# Patient Record
Sex: MALE | Race: WHITE | NOT HISPANIC OR LATINO | Employment: OTHER | URBAN - METROPOLITAN AREA
[De-identification: names, ages, dates, MRNs, and addresses within clinical notes are randomized per-mention and may not be internally consistent; named-entity substitution may affect disease eponyms.]

---

## 2017-02-13 ENCOUNTER — HOSPITAL ENCOUNTER (OUTPATIENT)
Dept: RADIOLOGY | Facility: HOSPITAL | Age: 67
Discharge: HOME | End: 2017-02-13
Attending: INTERNAL MEDICINE
Payer: MEDICARE

## 2017-02-13 DIAGNOSIS — J42 CHRONIC BRONCHITIS, UNSPECIFIED CHRONIC BRONCHITIS TYPE: ICD-10-CM

## 2017-02-13 DIAGNOSIS — R07.81 PLEURITIC CHEST PAIN: Primary | ICD-10-CM

## 2017-02-13 DIAGNOSIS — R07.81 PLEURITIC CHEST PAIN: ICD-10-CM

## 2017-02-13 PROCEDURE — 71020 XR CHEST PA AND LATERAL: CPT | Mod: TC,PO

## 2017-02-13 PROCEDURE — 71020 XR CHEST PA AND LATERAL: CPT | Mod: 26,,, | Performed by: RADIOLOGY

## 2017-06-15 ENCOUNTER — HOSPITAL ENCOUNTER (OUTPATIENT)
Dept: RADIOLOGY | Facility: HOSPITAL | Age: 67
Discharge: HOME | End: 2017-06-15
Attending: INTERNAL MEDICINE
Payer: MEDICARE

## 2017-06-15 DIAGNOSIS — Z01.818 PRE-OPERATIVE CLEARANCE: ICD-10-CM

## 2017-06-15 DIAGNOSIS — Z01.818 PRE-OPERATIVE CLEARANCE: Primary | ICD-10-CM

## 2017-06-15 PROCEDURE — 71020 XR CHEST PA AND LATERAL: CPT | Mod: 26,,, | Performed by: RADIOLOGY

## 2017-06-15 PROCEDURE — 71020 XR CHEST PA AND LATERAL: CPT | Mod: TC,PO

## 2018-06-06 ENCOUNTER — HOSPITAL ENCOUNTER (INPATIENT)
Facility: HOSPITAL | Age: 68
LOS: 5 days | Discharge: HOME | DRG: 872 | End: 2018-06-11
Attending: EMERGENCY MEDICINE | Admitting: INTERNAL MEDICINE
Payer: MEDICARE

## 2018-06-06 DIAGNOSIS — R65.20 SEVERE SEPSIS: Primary | ICD-10-CM

## 2018-06-06 DIAGNOSIS — J18.9 PNEUMONIA OF RIGHT MIDDLE LOBE DUE TO INFECTIOUS ORGANISM: ICD-10-CM

## 2018-06-06 DIAGNOSIS — R79.89 ELEVATED LIVER FUNCTION TESTS: ICD-10-CM

## 2018-06-06 DIAGNOSIS — N17.9 AKI (ACUTE KIDNEY INJURY): ICD-10-CM

## 2018-06-06 DIAGNOSIS — E87.20 LACTIC ACIDOSIS: ICD-10-CM

## 2018-06-06 DIAGNOSIS — K52.9 COLITIS: ICD-10-CM

## 2018-06-06 DIAGNOSIS — A41.9 SEVERE SEPSIS: Primary | ICD-10-CM

## 2018-06-06 LAB
ALBUMIN SERPL BCP-MCNC: 3.4 G/DL
ALP SERPL-CCNC: 89 U/L
ALT SERPL W/O P-5'-P-CCNC: 52 U/L
ANION GAP SERPL CALC-SCNC: 13 MMOL/L
ANISOCYTOSIS BLD QL SMEAR: SLIGHT
AST SERPL-CCNC: 41 U/L
BASOPHILS # BLD AUTO: ABNORMAL K/UL
BASOPHILS NFR BLD: 0 %
BILIRUB SERPL-MCNC: 1.5 MG/DL
BILIRUB UR QL STRIP: ABNORMAL
BUN SERPL-MCNC: 33 MG/DL
CALCIUM SERPL-MCNC: 8.3 MG/DL
CHLORIDE SERPL-SCNC: 98 MMOL/L
CLARITY UR REFRACT.AUTO: CLEAR
CO2 SERPL-SCNC: 24 MMOL/L
COLOR UR AUTO: ABNORMAL
CREAT SERPL-MCNC: 1.7 MG/DL
DIFFERENTIAL METHOD: ABNORMAL
EOSINOPHIL # BLD AUTO: ABNORMAL K/UL
EOSINOPHIL NFR BLD: 0 %
ERYTHROCYTE [DISTWIDTH] IN BLOOD BY AUTOMATED COUNT: 16.4 %
EST. GFR  (AFRICAN AMERICAN): 46.9 ML/MIN/1.73 M^2
EST. GFR  (NON AFRICAN AMERICAN): 40.5 ML/MIN/1.73 M^2
GLUCOSE SERPL-MCNC: 122 MG/DL
GLUCOSE UR QL STRIP: NEGATIVE
HCT VFR BLD AUTO: 49.7 %
HGB BLD-MCNC: 16.4 G/DL
HGB UR QL STRIP: NEGATIVE
KETONES UR QL STRIP: NEGATIVE
LACTATE SERPL-SCNC: 3.4 MMOL/L
LEUKOCYTE ESTERASE UR QL STRIP: NEGATIVE
LYMPHOCYTES # BLD AUTO: ABNORMAL K/UL
LYMPHOCYTES NFR BLD: 5 %
MCH RBC QN AUTO: 28.5 PG
MCHC RBC AUTO-ENTMCNC: 33 G/DL
MCV RBC AUTO: 86 FL
MONOCYTES # BLD AUTO: ABNORMAL K/UL
MONOCYTES NFR BLD: 3 %
NEUTROPHILS NFR BLD: 86 %
NEUTS BAND NFR BLD MANUAL: 6 %
NITRITE UR QL STRIP: NEGATIVE
PH UR STRIP: 6 [PH] (ref 5–8)
PLATELET # BLD AUTO: 305 K/UL
PMV BLD AUTO: 11.8 FL
POTASSIUM SERPL-SCNC: 5 MMOL/L
PROT SERPL-MCNC: 7.3 G/DL
PROT UR QL STRIP: ABNORMAL
RBC # BLD AUTO: 5.76 M/UL
SODIUM SERPL-SCNC: 135 MMOL/L
SP GR UR STRIP: >=1.03 (ref 1–1.03)
URN SPEC COLLECT METH UR: ABNORMAL
UROBILINOGEN UR STRIP-ACNC: NEGATIVE EU/DL
WBC # BLD AUTO: 24.8 K/UL

## 2018-06-06 PROCEDURE — 85007 BL SMEAR W/DIFF WBC COUNT: CPT

## 2018-06-06 PROCEDURE — 87040 BLOOD CULTURE FOR BACTERIA: CPT | Mod: 59

## 2018-06-06 PROCEDURE — 93010 ELECTROCARDIOGRAM REPORT: CPT | Mod: ,,, | Performed by: INTERNAL MEDICINE

## 2018-06-06 PROCEDURE — 80053 COMPREHEN METABOLIC PANEL: CPT

## 2018-06-06 PROCEDURE — 83605 ASSAY OF LACTIC ACID: CPT

## 2018-06-06 PROCEDURE — 21400001 HC TELEMETRY ROOM

## 2018-06-06 PROCEDURE — 93005 ELECTROCARDIOGRAM TRACING: CPT

## 2018-06-06 PROCEDURE — 81003 URINALYSIS AUTO W/O SCOPE: CPT

## 2018-06-06 PROCEDURE — 99900035 HC TECH TIME PER 15 MIN (STAT)

## 2018-06-06 PROCEDURE — 63600175 PHARM REV CODE 636 W HCPCS: Performed by: EMERGENCY MEDICINE

## 2018-06-06 PROCEDURE — 85027 COMPLETE CBC AUTOMATED: CPT

## 2018-06-06 PROCEDURE — 25000003 PHARM REV CODE 250: Performed by: EMERGENCY MEDICINE

## 2018-06-06 RX ORDER — PREDNISONE 5 MG/1
1 TABLET ORAL DAILY
COMMUNITY
Start: 2018-05-30

## 2018-06-06 RX ORDER — BACLOFEN 10 MG/1
1 TABLET ORAL 3 TIMES DAILY
COMMUNITY
Start: 2018-02-06

## 2018-06-06 RX ORDER — QUETIAPINE FUMARATE 25 MG/1
25 TABLET, FILM COATED ORAL NIGHTLY
COMMUNITY

## 2018-06-06 RX ORDER — CYPROHEPTADINE HYDROCHLORIDE 4 MG/1
2 TABLET ORAL 3 TIMES DAILY
COMMUNITY
Start: 2018-05-30

## 2018-06-06 RX ORDER — DEXTROSE MONOHYDRATE AND SODIUM CHLORIDE 5; .45 G/100ML; G/100ML
INJECTION, SOLUTION INTRAVENOUS CONTINUOUS
Status: DISCONTINUED | OUTPATIENT
Start: 2018-06-07 | End: 2018-06-09

## 2018-06-06 RX ORDER — CEFEPIME HYDROCHLORIDE 2 G/1
2 INJECTION, POWDER, FOR SOLUTION INTRAVENOUS
Status: COMPLETED | OUTPATIENT
Start: 2018-06-06 | End: 2018-06-06

## 2018-06-06 RX ORDER — TRAMADOL HYDROCHLORIDE 50 MG/1
50 TABLET ORAL EVERY 8 HOURS PRN
COMMUNITY
Start: 2018-05-04

## 2018-06-06 RX ORDER — ROPINIROLE 1 MG/1
1 TABLET, FILM COATED ORAL NIGHTLY
COMMUNITY
Start: 2018-04-06

## 2018-06-06 RX ORDER — TRAZODONE HYDROCHLORIDE 100 MG/1
1 TABLET ORAL NIGHTLY
COMMUNITY
Start: 2015-11-10

## 2018-06-06 RX ORDER — SILDENAFIL 100 MG/1
100 TABLET, FILM COATED ORAL
COMMUNITY
Start: 2018-03-27 | End: 2019-03-27

## 2018-06-06 RX ORDER — ASPIRIN 81 MG/1
81 TABLET ORAL DAILY
COMMUNITY

## 2018-06-06 RX ORDER — ALENDRONATE SODIUM 35 MG/1
1 TABLET ORAL
COMMUNITY
Start: 2017-05-31

## 2018-06-06 RX ORDER — ONDANSETRON 2 MG/ML
4 INJECTION INTRAMUSCULAR; INTRAVENOUS
Status: COMPLETED | OUTPATIENT
Start: 2018-06-06 | End: 2018-06-06

## 2018-06-06 RX ORDER — ALBUTEROL SULFATE 2.5 MG/.5ML
2.5 SOLUTION RESPIRATORY (INHALATION) EVERY 4 HOURS PRN
COMMUNITY

## 2018-06-06 RX ORDER — ROSUVASTATIN CALCIUM 20 MG/1
1 TABLET, COATED ORAL DAILY
COMMUNITY
Start: 2016-12-27

## 2018-06-06 RX ORDER — OMEPRAZOLE 20 MG/1
1 CAPSULE, DELAYED RELEASE ORAL DAILY
COMMUNITY
Start: 2018-01-26

## 2018-06-06 RX ORDER — PREGABALIN 150 MG/1
300 CAPSULE ORAL 2 TIMES DAILY
COMMUNITY
Start: 2017-11-09 | End: 2018-11-09

## 2018-06-06 RX ORDER — BUPROPION HYDROCHLORIDE 150 MG/1
150 TABLET, EXTENDED RELEASE ORAL 2 TIMES DAILY
COMMUNITY
Start: 2018-05-04

## 2018-06-06 RX ORDER — VENLAFAXINE 37.5 MG/1
TABLET ORAL 2 TIMES DAILY
COMMUNITY
Start: 2018-03-20

## 2018-06-06 RX ORDER — MELOXICAM 7.5 MG/1
1 TABLET ORAL 2 TIMES DAILY
COMMUNITY
Start: 2018-04-02

## 2018-06-06 RX ORDER — MULTIVITAMIN
1 TABLET ORAL DAILY
COMMUNITY

## 2018-06-06 RX ORDER — CLOPIDOGREL BISULFATE 75 MG/1
1 TABLET ORAL DAILY
COMMUNITY
Start: 2018-02-06

## 2018-06-06 RX ADMIN — CEFEPIME 2 G: 2 INJECTION, POWDER, FOR SOLUTION INTRAVENOUS at 09:06

## 2018-06-06 RX ADMIN — SODIUM CHLORIDE 2721 ML: 0.9 INJECTION, SOLUTION INTRAVENOUS at 08:06

## 2018-06-06 RX ADMIN — ONDANSETRON 4 MG: 2 INJECTION INTRAMUSCULAR; INTRAVENOUS at 08:06

## 2018-06-06 RX ADMIN — AZITHROMYCIN MONOHYDRATE 500 MG: 500 INJECTION, POWDER, LYOPHILIZED, FOR SOLUTION INTRAVENOUS at 09:06

## 2018-06-06 RX ADMIN — DEXTROSE AND SODIUM CHLORIDE: 5; .45 INJECTION, SOLUTION INTRAVENOUS at 11:06

## 2018-06-07 PROBLEM — K52.9 ENTERITIS: Status: ACTIVE | Noted: 2018-06-07

## 2018-06-07 PROBLEM — J44.9 COPD (CHRONIC OBSTRUCTIVE PULMONARY DISEASE): Status: ACTIVE | Noted: 2018-06-07

## 2018-06-07 PROBLEM — N17.9 ACUTE RENAL FAILURE: Status: ACTIVE | Noted: 2018-06-07

## 2018-06-07 PROBLEM — I25.10 CAD (CORONARY ARTERY DISEASE): Status: ACTIVE | Noted: 2018-06-07

## 2018-06-07 LAB
ALBUMIN SERPL BCP-MCNC: 2.8 G/DL
ALP SERPL-CCNC: 79 U/L
ALT SERPL W/O P-5'-P-CCNC: 35 U/L
ANION GAP SERPL CALC-SCNC: 6 MMOL/L
AST SERPL-CCNC: 31 U/L
BASOPHILS # BLD AUTO: 0.04 K/UL
BASOPHILS NFR BLD: 0.3 %
BILIRUB SERPL-MCNC: 0.9 MG/DL
BUN SERPL-MCNC: 27 MG/DL
CALCIUM SERPL-MCNC: 7.5 MG/DL
CHLORIDE SERPL-SCNC: 105 MMOL/L
CO2 SERPL-SCNC: 24 MMOL/L
CREAT SERPL-MCNC: 1.3 MG/DL
DIFFERENTIAL METHOD: ABNORMAL
EOSINOPHIL # BLD AUTO: 0.1 K/UL
EOSINOPHIL NFR BLD: 0.9 %
ERYTHROCYTE [DISTWIDTH] IN BLOOD BY AUTOMATED COUNT: 16 %
EST. GFR  (AFRICAN AMERICAN): >60 ML/MIN/1.73 M^2
EST. GFR  (NON AFRICAN AMERICAN): 56 ML/MIN/1.73 M^2
GLUCOSE SERPL-MCNC: 93 MG/DL
HCT VFR BLD AUTO: 40.1 %
HGB BLD-MCNC: 13.1 G/DL
LACTATE SERPL-SCNC: 1.5 MMOL/L
LACTATE SERPL-SCNC: 2.2 MMOL/L
LYMPHOCYTES # BLD AUTO: 0.9 K/UL
LYMPHOCYTES NFR BLD: 6.2 %
MCH RBC QN AUTO: 28.8 PG
MCHC RBC AUTO-ENTMCNC: 32.7 G/DL
MCV RBC AUTO: 88 FL
MONOCYTES # BLD AUTO: 0.8 K/UL
MONOCYTES NFR BLD: 5.4 %
NEUTROPHILS # BLD AUTO: 12.2 K/UL
NEUTROPHILS NFR BLD: 87.2 %
PLATELET # BLD AUTO: 175 K/UL
PLATELET BLD QL SMEAR: ABNORMAL
PMV BLD AUTO: 11 FL
POTASSIUM SERPL-SCNC: 4.1 MMOL/L
PROT SERPL-MCNC: 5.9 G/DL
RBC # BLD AUTO: 4.55 M/UL
SODIUM SERPL-SCNC: 135 MMOL/L
WBC # BLD AUTO: 14.08 K/UL

## 2018-06-07 PROCEDURE — 25000003 PHARM REV CODE 250: Performed by: NURSE PRACTITIONER

## 2018-06-07 PROCEDURE — 96375 TX/PRO/DX INJ NEW DRUG ADDON: CPT

## 2018-06-07 PROCEDURE — 83605 ASSAY OF LACTIC ACID: CPT

## 2018-06-07 PROCEDURE — 96365 THER/PROPH/DIAG IV INF INIT: CPT | Mod: 59

## 2018-06-07 PROCEDURE — 96376 TX/PRO/DX INJ SAME DRUG ADON: CPT

## 2018-06-07 PROCEDURE — 87046 STOOL CULTR AEROBIC BACT EA: CPT | Mod: 59

## 2018-06-07 PROCEDURE — S5010 5% DEXTROSE AND 0.45% SALINE: HCPCS | Performed by: EMERGENCY MEDICINE

## 2018-06-07 PROCEDURE — 21400001 HC TELEMETRY ROOM

## 2018-06-07 PROCEDURE — 25000003 PHARM REV CODE 250: Performed by: EMERGENCY MEDICINE

## 2018-06-07 PROCEDURE — 99285 EMERGENCY DEPT VISIT HI MDM: CPT | Mod: 25

## 2018-06-07 PROCEDURE — 96366 THER/PROPH/DIAG IV INF ADDON: CPT

## 2018-06-07 PROCEDURE — 85025 COMPLETE CBC W/AUTO DIFF WBC: CPT

## 2018-06-07 PROCEDURE — S0030 INJECTION, METRONIDAZOLE: HCPCS | Performed by: NURSE PRACTITIONER

## 2018-06-07 PROCEDURE — 63600175 PHARM REV CODE 636 W HCPCS: Performed by: NURSE PRACTITIONER

## 2018-06-07 PROCEDURE — 87427 SHIGA-LIKE TOXIN AG IA: CPT

## 2018-06-07 PROCEDURE — 87045 FECES CULTURE AEROBIC BACT: CPT

## 2018-06-07 PROCEDURE — 63600175 PHARM REV CODE 636 W HCPCS: Performed by: HOSPITALIST

## 2018-06-07 PROCEDURE — 89055 LEUKOCYTE ASSESSMENT FECAL: CPT

## 2018-06-07 PROCEDURE — 63600175 PHARM REV CODE 636 W HCPCS: Performed by: EMERGENCY MEDICINE

## 2018-06-07 PROCEDURE — 96367 TX/PROPH/DG ADDL SEQ IV INF: CPT

## 2018-06-07 PROCEDURE — 80053 COMPREHEN METABOLIC PANEL: CPT

## 2018-06-07 PROCEDURE — 96361 HYDRATE IV INFUSION ADD-ON: CPT

## 2018-06-07 PROCEDURE — 99233 SBSQ HOSP IP/OBS HIGH 50: CPT | Mod: ,,, | Performed by: SURGERY

## 2018-06-07 RX ORDER — METRONIDAZOLE 500 MG/100ML
500 INJECTION, SOLUTION INTRAVENOUS
Status: DISCONTINUED | OUTPATIENT
Start: 2018-06-07 | End: 2018-06-10 | Stop reason: ALTCHOICE

## 2018-06-07 RX ORDER — CIPROFLOXACIN 2 MG/ML
400 INJECTION, SOLUTION INTRAVENOUS
Status: DISCONTINUED | OUTPATIENT
Start: 2018-06-07 | End: 2018-06-08

## 2018-06-07 RX ORDER — CEFEPIME HYDROCHLORIDE 2 G/50ML
2 INJECTION, SOLUTION INTRAVENOUS
Status: DISCONTINUED | OUTPATIENT
Start: 2018-06-08 | End: 2018-06-10

## 2018-06-07 RX ORDER — CEFEPIME HYDROCHLORIDE 2 G/1
2 INJECTION, POWDER, FOR SOLUTION INTRAVENOUS
Status: DISCONTINUED | OUTPATIENT
Start: 2018-06-07 | End: 2018-06-07

## 2018-06-07 RX ORDER — ONDANSETRON 2 MG/ML
4 INJECTION INTRAMUSCULAR; INTRAVENOUS EVERY 6 HOURS PRN
Status: DISCONTINUED | OUTPATIENT
Start: 2018-06-07 | End: 2018-06-11 | Stop reason: HOSPADM

## 2018-06-07 RX ORDER — RAMELTEON 8 MG/1
8 TABLET ORAL NIGHTLY PRN
Status: DISCONTINUED | OUTPATIENT
Start: 2018-06-07 | End: 2018-06-07

## 2018-06-07 RX ORDER — MORPHINE SULFATE 2 MG/ML
2 INJECTION, SOLUTION INTRAMUSCULAR; INTRAVENOUS EVERY 4 HOURS PRN
Status: DISCONTINUED | OUTPATIENT
Start: 2018-06-07 | End: 2018-06-09

## 2018-06-07 RX ADMIN — DEXTROSE AND SODIUM CHLORIDE: 5; .45 INJECTION, SOLUTION INTRAVENOUS at 08:06

## 2018-06-07 RX ADMIN — METRONIDAZOLE 500 MG: 500 INJECTION, SOLUTION INTRAVENOUS at 11:06

## 2018-06-07 RX ADMIN — AZITHROMYCIN MONOHYDRATE 500 MG: 500 INJECTION, POWDER, LYOPHILIZED, FOR SOLUTION INTRAVENOUS at 09:06

## 2018-06-07 RX ADMIN — CIPROFLOXACIN 400 MG: 2 INJECTION, SOLUTION INTRAVENOUS at 02:06

## 2018-06-07 RX ADMIN — DEXTROSE AND SODIUM CHLORIDE: 5; .45 INJECTION, SOLUTION INTRAVENOUS at 07:06

## 2018-06-07 RX ADMIN — MORPHINE SULFATE 2 MG: 2 INJECTION, SOLUTION INTRAMUSCULAR; INTRAVENOUS at 11:06

## 2018-06-07 RX ADMIN — CIPROFLOXACIN 400 MG: 2 INJECTION, SOLUTION INTRAVENOUS at 11:06

## 2018-06-07 RX ADMIN — CEFEPIME 2 G: 2 INJECTION, POWDER, FOR SOLUTION INTRAVENOUS at 05:06

## 2018-06-07 RX ADMIN — METRONIDAZOLE 500 MG: 500 INJECTION, SOLUTION INTRAVENOUS at 04:06

## 2018-06-07 NOTE — HOSPITAL COURSE
Presented to the emergency room with abdominal pain, nausea and vomiting, and diarrhea.  Treated with IV fluids and antibiotics.  CT performed.  Patient admitted with presumed enteritis.  Surgery was consulted    Less abdominal pain, but not resolved, mild nausea, formed stool passed, GB no seen on ultrasound.    June 9, 2018 at 12:14 p.m.:  Patient complains no abdominal pain.  Abdomen is soft and nondistended and nontender.  Patient is hungry and he wishes to eat.  The CT images confirms presence of gallstones.  The recommendation at this time is to resume oral feeding.  (Hussein Pearson)    06/11/2018.  Patient states he has got a crampy abdominal pain after a few bites of regular food.  He states today he has some generalized soreness around the abdomen mainly on the left side.  He does not report any nausea and vomiting.  The contracted gallbladder around the gallstones is probably old and chronic and I do not feel it is the cause of his current abdominal discomfort.    The CT scan did show some calcifications in  the intra-abdominal aorta

## 2018-06-07 NOTE — ED NOTES
Received pt lying on stretcher with SR up X 2. HOB elevated 90. Call light in reach. Pt admitted - awaiting bed availability.  Cont on monitor with NSR. Pt O2 87-90. O2 4L/NC applied - pt states will keep oxygen on for awhile and then will take it off because it bothers him. Pt with no c/o of abd pain at this time. Pt has IV fluids infusing to L hand and RFA with no s/s of infiltration noted. Pt voiding with no difficulty -clear michelle urine noted.   Pt up in chair to clean bed linens - stool noted on bed. Pt clothes soiled and removed with gown applied. Pt extremely SOB with exertion to chair and back to bed. Pt states has lungs scarred from previous rib fractures.   Repositioned to comfort

## 2018-06-07 NOTE — CONSULTS
[facility] -   General Surgery  Consult Note    Patient Name: Vinicius Sung  MRN: [id number]  Code Status: No Order  Admission Date: 6/6/2018  Hospital Length of Stay: 0 days  Attending Physician: Eric Monge MD  Primary Care Provider: Vj Gonzalez MD    Patient information was obtained from parent and ER records.     Inpatient consult to General Surgery  Consult performed by: YESSY COOPER  Consult ordered by: ALEN DAS  Reason for consult: Dilated bowel on CT scan abdominal distention  Assessment/Recommendations: Suspected enteritis nausea and vomiting, and associated diarrhea.  Volume depletion.  Elevated white count.  These contribute to sepsis        Subjective:     Principal Problem: <principal problem not specified>    History of Present Illness: Patient reports he developed nausea vomiting and diarrhea all started about Monday.  This was associated with a bad taste in his mouth.  He developed abdominal distention and some cramping pain. He presented to the emergency room where CT scan showed some dilated loops of small bowel as well as thickening of the colon.  He had an elevated white count and a mild increase in his lactic acid.    Patient was treated with IV fluids and antibiotics.    He states he is feeling better this morning    No current facility-administered medications on file prior to encounter.      No current outpatient prescriptions on file prior to encounter.       Review of patient's allergies indicates:  No Known Allergies    Past Medical History:   Diagnosis Date    CAD (coronary artery disease)     COPD (chronic obstructive pulmonary disease)     Coronary artery disease     GERD (gastroesophageal reflux disease)     Major depressive disorder     PAD (peripheral artery disease)     ST elevation (STEMI) myocardial infarction     Myocardial Infarction     Past Surgical History:   Procedure Laterality Date    BACK SURGERY      CARDIAC SURGERY       [place] AMPUTATION LOW LEG THRU TIB/FIB      Amputation, Below The Knee     Family History     None        Social History Main Topics    Smoking status: Former Smoker     Types: Cigarettes    Smokeless tobacco: Not on file      Comment: 3ppd smoker x 35yrs, recently quit in past week or so    Alcohol use No      Comment: quit 14 yrs ago    Drug use: No    Sexual activity: Not on file     Review of Systems   Constitutional: Positive for fatigue.   HENT: Negative.         Bad taste in his mouth   Eyes: Negative.    Respiratory: Negative.    Cardiovascular: Negative.    Gastrointestinal: Positive for abdominal distention, abdominal pain, diarrhea, nausea and vomiting.   Endocrine: Negative.    Genitourinary: Negative.    Musculoskeletal: Negative.    Skin: Negative.    Allergic/Immunologic: Negative.    Neurological: Negative.    Hematological: Negative.    Psychiatric/Behavioral: Negative.      Objective:     Vital Signs (Most Recent):  Temp: 98.6 °F (37 °C) (06/07/18 0645)  Pulse: 95 (06/07/18 0801)  Resp: 20 (06/07/18 0801)  BP: (!) 129/59 (06/07/18 0801)  SpO2: 98 % (06/07/18 0801) Vital Signs (24h Range):  Temp:  [98 °F (36.7 °C)-98.7 °F (37.1 °C)] 98.6 °F (37 °C)  Pulse:  [] 95  Resp:  [18-27] 20  SpO2:  [91 %-98 %] 98 %  BP: ()/(57-74) 129/59     Weight: 90.7 kg (200 lb)  Body mass index is 27.12 kg/m².    Physical Exam   Constitutional: He is oriented to person, place, and time.   Mildly chronically ill, somewhat unkept   HENT:   Head: Normocephalic and atraumatic.   adentulous   Neck: Normal range of motion. Neck supple. No tracheal deviation present. No thyromegaly present.   Cardiovascular: Normal rate and regular rhythm.    Pulmonary/Chest: Effort normal and breath sounds normal.   Abdominal: Soft. He exhibits distension. Tenderness:  minimal generalized. There is no rebound and no guarding. No hernia.   Musculoskeletal: Normal range of motion.   Lymphadenopathy:     He has no cervical  adenopathy.   Neurological: He is oriented to person, place, and time.   Skin: Skin is warm. Capillary refill takes less than 2 seconds.   Psychiatric: He has a normal mood and affect. His behavior is normal. Judgment and thought content normal.   Vitals reviewed.      Significant Labs:  CBC:   Recent Labs  Lab 06/07/18  0543   WBC 14.08*   RBC 4.55*   HGB 13.1*   HCT 40.1      MCV 88   MCH 28.8   MCHC 32.7     BMP:   Recent Labs  Lab 06/06/18  2106   *   *   K 5.0   CL 98   CO2 24   BUN 33*   CREATININE 1.7*   CALCIUM 8.3*     CMP:   Recent Labs  Lab 06/06/18  2106   *   CALCIUM 8.3*   ALBUMIN 3.4*   PROT 7.3   *   K 5.0   CO2 24   CL 98   BUN 33*   CREATININE 1.7*   ALKPHOS 89   ALT 52*   AST 41*   BILITOT 1.5*     LFTs:   Recent Labs  Lab 06/06/18  2106   ALT 52*   AST 41*   ALKPHOS 89   BILITOT 1.5*   PROT 7.3   ALBUMIN 3.4*     Lactic acid levels reviewed  Coagulation: No results for input(s): LABPROT, INR, APTT in the last 168 hours.  Cardiac markers: No results for input(s): CKMB, CPKMB, TROPONINT, TROPONINI, MYOGLOBIN in the last 168 hours.  ABGs: No results for input(s): PH, PCO2, PO2, HCO3, POCSATURATED, BE in the last 168 hours.    Significant Diagnostics:      EXAMINATION:  CT ABDOMEN PELVIS WITHOUT CONTRAST    CLINICAL HISTORY:  Abdominal Pain;    FINDINGS:  There is some scarring in both lung bases.  Atelectatic change also noted with some pleural thickening which may be chronic.  See the liver and spleen is normal.  There are stones in the gallbladder.  The pancreas appears normal.  The right adrenal gland is unremarkable.  There are surgical clips in the expected location of the left adrenal gland.  No kidney stones or hydronephrosis.  The aorta is atherosclerotic.  No adenopathy or free fluid.  Multiple dilated loops of small bowel noted involving the upper abdomen with collapsed distal small bowel.  As she has all the is there is nonspecific thickening of the wall  of the sigmoid and rectum.  This is there is also thickening of the wall of the urinary bladder.  Compression deformities involving the L3 and L5 bodies.  Postoperative changes of the thoracolumbar spine.  Baclofen pump in the right gluteal region.  Postop changes left pelvis.   Impression       Multiple dilated loops of small bowel in the upper abdomen with collapsed distal small bowel suggesting partial or developing small bowel obstruction.  Cholelithiasis.  Atelectatic change in both lung bases with pleural thickening along with some pulmonary parenchymal scarring.  Thickening of the wall of the rectum and sigmoid raising the question of colitis.  Thickening of the wall of the urinary bladder.    All CT scans at this facility use dose modulation, iterative reconstruction, and/or weight based dosing when appropriate to reduce radiation dose to as low as reasonably achievable.      Electronically signed by: Genaro Isabel MD  Date: 06/06/2018  Time: 22:17         Assessment/Plan:     Severe sepsis    Likely sent and there E to enteritis with volume depletion  Agree with antibiotics and IV fluids          VTE Risk Mitigation     None          Thank you for your consult. I will follow-up with patient. Please contact us if you have any additional questions.    Ovidio Mariee MD  General Surgery  [facility] -

## 2018-06-07 NOTE — ED NOTES
LOC: The patient is awake, alert and aware of environment with an appropriate affect, the patient is oriented x 3 and speaking appropriately.  C/O nausea, vomiting, diarrhea & generalized abdominal pain x  past 3 days.  Pt states he is unable to eat solids or tolerate water.  Pt denies  blood in stool or emesis.   Also c/o headache.    APPEARANCE: Patient is clean and well groomed, patient's clothing is properly fastened.  HEENT: mucus membranes dry  SKIN: Brief WNL.   MUSCULOSKELETAL: Brief WNL except generalized  Skeletal pain as usual secondary to x broken bones from fall yrs ago  RESPIRATORY: Loose cough x multiple days (more than usual), SOB with exertion. Rhonchi throughout ( pt provides hx of 3ppd smoker x about 35 yrs) recently on Chantix to quit smoking  CARDIAC: Sinus Tach at 118/min.  Denies chest pain  GASTRO: generalized tenderness throughout abdomen, >tenderness to right upper & lower quads.  Abdomen distended & tight  : Brief WNL, denies difficulty voiding & states he hasnt noticed that his  urine is darker than usual  Peripheral Vasc: Brief WNL, no peripheral edema  NEURO: Brief WNL  PSYCH: Brief WNLPatient verbally verified and Spelled Full Name and Date of Birth. Fall precautions in place at this time: Patient side rails are up x 2, bed is low and locked, call light is in reach of pt, and fall risk band applied to right wrist.  Pt educated on fall risk and verbalized understanding.

## 2018-06-07 NOTE — ED NOTES
Pt up to BSC - voided and had very soft formed brown stool.  Specimen collected and sent to lab.  Back to bed and repositioned to comfort.

## 2018-06-07 NOTE — HPI
Vinicius Sung is a 67 year old male with history of COPD, CAD, and PAD who presented from [facility] Emergency Department for abdominal pain that began 3 days ago. Associated symptoms include diarrhea, decreased appetite, vomiting, and metallic taste. He denies fever, chills, and chest pain. CT Chest showed evidence of colitis involving rectum and sigmoid and possible partial small bowel obstruction. He was subsequently transferred for further work up. Patient reports abdominal pain has significantly improved. No further episodes of diarrhea and vomiting. He remains NPO.

## 2018-06-07 NOTE — SUBJECTIVE & OBJECTIVE
Past Medical History:   Diagnosis Date    CAD (coronary artery disease)     COPD (chronic obstructive pulmonary disease)     Coronary artery disease     GERD (gastroesophageal reflux disease)     Major depressive disorder     PAD (peripheral artery disease)     ST elevation (STEMI) myocardial infarction     Myocardial Infarction       Past Surgical History:   Procedure Laterality Date    BACK SURGERY      CARDIAC SURGERY      [place] AMPUTATION LOW LEG THRU TIB/FIB      Amputation, Below The Knee       Review of patient's allergies indicates:  No Known Allergies    No current facility-administered medications on file prior to encounter.      No current outpatient prescriptions on file prior to encounter.     Family History     None        Social History Main Topics    Smoking status: Former Smoker     Types: Cigarettes    Smokeless tobacco: Not on file      Comment: 3ppd smoker x 35yrs, recently quit in past week or so    Alcohol use No      Comment: quit 14 yrs ago    Drug use: No    Sexual activity: Not on file     Review of Systems   Constitutional: Positive for fatigue. Negative for activity change, appetite change and chills.   HENT: Negative.  Negative for congestion, drooling, hearing loss and postnasal drip.         Bad taste in his mouth   Eyes: Negative.    Respiratory: Negative for cough, chest tightness and shortness of breath.    Cardiovascular: Negative.  Negative for chest pain and leg swelling.   Gastrointestinal: Positive for abdominal distention, abdominal pain, diarrhea, nausea and vomiting. Negative for anal bleeding and blood in stool.   Endocrine: Negative.  Negative for cold intolerance, polydipsia and polyuria.   Genitourinary: Negative.  Negative for discharge, dysuria, frequency and penile pain.   Musculoskeletal: Negative.  Negative for back pain, gait problem and joint swelling.   Skin: Negative.  Negative for color change and rash.   Allergic/Immunologic: Negative.     Neurological: Negative.    Hematological: Negative.    Psychiatric/Behavioral: Negative.       Objective:     Vital Signs (Most Recent):  Temp: 98 °F (36.7 °C) (06/07/18 1305)  Pulse: 96 (06/07/18 1305)  Resp: 18 (06/07/18 1305)  BP: 118/72 (06/07/18 1305)  SpO2: 95 % (06/07/18 1305) Vital Signs (24h Range):  Temp:  [98 °F (36.7 °C)-98.7 °F (37.1 °C)] 98 °F (36.7 °C)  Pulse:  [] 96  Resp:  [18-27] 18  SpO2:  [91 %-98 %] 95 %  BP: ()/(57-74) 118/72     Weight: 90.7 kg (200 lb)  Body mass index is 27.12 kg/m².    Physical Exam     Significant Labs:   CBC:   Recent Labs  Lab 06/06/18  2028 06/07/18  0543   WBC 24.80* 14.08*   HGB 16.4 13.1*   HCT 49.7 40.1    175     CMP:   Recent Labs  Lab 06/06/18  2106 06/07/18  0832   * 135*   K 5.0 4.1   CL 98 105   CO2 24 24   * 93   BUN 33* 27*   CREATININE 1.7* 1.3   CALCIUM 8.3* 7.5*   PROT 7.3 5.9*   ALBUMIN 3.4* 2.8*   BILITOT 1.5* 0.9   ALKPHOS 89 79   AST 41* 31   ALT 52* 35   ANIONGAP 13 6*   EGFRNONAA 40.5* 56*       Significant Imaging:  Imaging Results          US Abdomen Limited (Final result)  Result time 06/07/18 09:43:31    Final result by Carlton Maradiaga MD (06/07/18 09:43:31)                 Impression:      Fatty infiltration liver.  Limited study.    Gallbladder is not seen and on CT is contracted and calcified.      Electronically signed by: Carlton Maradiaga MD  Date:    06/07/2018  Time:    09:43             Narrative:    EXAMINATION:  US ABDOMEN LIMITED    CLINICAL HISTORY:  abdominal pain, elevated LFTs, sepsis;    COMPARISON:  06/06/2018    FINDINGS:  Limited right upper quadrant ultrasound performed.  The liver measures 14.7 cm in length and demonstrates increased echogenicity.  Common bile duct is within normal limits at 3.5 mm..  Gallbladder is not seen and on CT is contracted and calcified.  Pancreas is obscured by bowel gas.  The right kidney is normal in length measuring 11.4 cm. No right sided hydronephrosis or renal  masses identified.                               CT Abdomen Pelvis  Without Contrast (Final result)  Result time 06/06/18 22:17:25   Procedure changed from CT Abdomen Pelvis With Contrast     Final result by Genaro Isabel MD (06/06/18 22:17:25)                 Impression:      Multiple dilated loops of small bowel in the upper abdomen with collapsed distal small bowel suggesting partial or developing small bowel obstruction.  Cholelithiasis.  Atelectatic change in both lung bases with pleural thickening along with some pulmonary parenchymal scarring.  Thickening of the wall of the rectum and sigmoid raising the question of colitis.  Thickening of the wall of the urinary bladder.    All CT scans at this facility use dose modulation, iterative reconstruction, and/or weight based dosing when appropriate to reduce radiation dose to as low as reasonably achievable.      Electronically signed by: Genaro Isabel MD  Date:    06/06/2018  Time:    22:17             Narrative:    EXAMINATION:  CT ABDOMEN PELVIS WITHOUT CONTRAST    CLINICAL HISTORY:  Abdominal Pain;    FINDINGS:  There is some scarring in both lung bases.  Atelectatic change also noted with some pleural thickening which may be chronic.  See the liver and spleen is normal.  There are stones in the gallbladder.  The pancreas appears normal.  The right adrenal gland is unremarkable.  There are surgical clips in the expected location of the left adrenal gland.  No kidney stones or hydronephrosis.  The aorta is atherosclerotic.  No adenopathy or free fluid.  Multiple dilated loops of small bowel noted involving the upper abdomen with collapsed distal small bowel.  As she has all the is there is nonspecific thickening of the wall of the sigmoid and rectum.  This is there is also thickening of the wall of the urinary bladder.  Compression deformities involving the L3 and L5 bodies.  Postoperative changes of the thoracolumbar spine.  Baclofen pump in the right gluteal  region.  Postop changes left pelvis.                               X-Ray Chest AP Portable (Final result)  Result time 06/06/18 20:53:11    Final result by Genaro Isabel MD (06/06/18 20:53:11)                 Impression:      Infiltrate lateral right midlung.    All CT scans at this facility use dose modulation, iterative reconstruction, and/or weight based dosing when appropriate to reduce radiation dose to as low as reasonably achievable.      Electronically signed by: Genaro Isabel MD  Date:    06/06/2018  Time:    20:53             Narrative:    EXAMINATION:  XR CHEST AP PORTABLE    CLINICAL HISTORY:  Cough;    COMPARISON:  06/15/2017.    FINDINGS:  The heart is normal in size.  Sternotomy wires.  There is scarring in the lung bases.  Infiltrate in the lateral right mid lung.

## 2018-06-07 NOTE — ED NOTES
Pt up in chair and then placed in hospital bed. Pt did get SOB with exertion. O2 applied and encouraged pt to leave on until SOB passes.  Positioned to comfort - pt states feels much better in hospital bed. Pt requesting something to drink - cont NPO for now.

## 2018-06-07 NOTE — SUBJECTIVE & OBJECTIVE
No current facility-administered medications on file prior to encounter.      No current outpatient prescriptions on file prior to encounter.       Review of patient's allergies indicates:  No Known Allergies    Past Medical History:   Diagnosis Date    CAD (coronary artery disease)     COPD (chronic obstructive pulmonary disease)     Coronary artery disease     GERD (gastroesophageal reflux disease)     Major depressive disorder     PAD (peripheral artery disease)     ST elevation (STEMI) myocardial infarction     Myocardial Infarction     Past Surgical History:   Procedure Laterality Date    BACK SURGERY      CARDIAC SURGERY      [place] AMPUTATION LOW LEG THRU TIB/FIB      Amputation, Below The Knee     Family History     None        Social History Main Topics    Smoking status: Former Smoker     Types: Cigarettes    Smokeless tobacco: Not on file      Comment: 3ppd smoker x 35yrs, recently quit in past week or so    Alcohol use No      Comment: quit 14 yrs ago    Drug use: No    Sexual activity: Not on file     Review of Systems   Constitutional: Positive for fatigue.   HENT: Negative.         Bad taste in his mouth   Eyes: Negative.    Respiratory: Negative.    Cardiovascular: Negative.    Gastrointestinal: Positive for abdominal distention, abdominal pain, diarrhea, nausea and vomiting.   Endocrine: Negative.    Genitourinary: Negative.    Musculoskeletal: Negative.    Skin: Negative.    Allergic/Immunologic: Negative.    Neurological: Negative.    Hematological: Negative.    Psychiatric/Behavioral: Negative.      Objective:     Vital Signs (Most Recent):  Temp: 98.6 °F (37 °C) (06/07/18 0645)  Pulse: 95 (06/07/18 0801)  Resp: 20 (06/07/18 0801)  BP: (!) 129/59 (06/07/18 0801)  SpO2: 98 % (06/07/18 0801) Vital Signs (24h Range):  Temp:  [98 °F (36.7 °C)-98.7 °F (37.1 °C)] 98.6 °F (37 °C)  Pulse:  [] 95  Resp:  [18-27] 20  SpO2:  [91 %-98 %] 98 %  BP: ()/(57-74) 129/59     Weight: 90.7 kg  (200 lb)  Body mass index is 27.12 kg/m².    Physical Exam   Constitutional: He is oriented to person, place, and time.   Mildly chronically ill, somewhat unkept   HENT:   Head: Normocephalic and atraumatic.   adentulous   Neck: Normal range of motion. Neck supple. No tracheal deviation present. No thyromegaly present.   Cardiovascular: Normal rate and regular rhythm.    Pulmonary/Chest: Effort normal and breath sounds normal.   Abdominal: Soft. He exhibits distension. Tenderness:  minimal generalized. There is no rebound and no guarding. No hernia.   Musculoskeletal: Normal range of motion.   Lymphadenopathy:     He has no cervical adenopathy.   Neurological: He is oriented to person, place, and time.   Skin: Skin is warm. Capillary refill takes less than 2 seconds.   Psychiatric: He has a normal mood and affect. His behavior is normal. Judgment and thought content normal.   Vitals reviewed.      Significant Labs:  CBC:   Recent Labs  Lab 06/07/18  0543   WBC 14.08*   RBC 4.55*   HGB 13.1*   HCT 40.1      MCV 88   MCH 28.8   MCHC 32.7     BMP:   Recent Labs  Lab 06/06/18  2106   *   *   K 5.0   CL 98   CO2 24   BUN 33*   CREATININE 1.7*   CALCIUM 8.3*     CMP:   Recent Labs  Lab 06/06/18  2106   *   CALCIUM 8.3*   ALBUMIN 3.4*   PROT 7.3   *   K 5.0   CO2 24   CL 98   BUN 33*   CREATININE 1.7*   ALKPHOS 89   ALT 52*   AST 41*   BILITOT 1.5*     LFTs:   Recent Labs  Lab 06/06/18  2106   ALT 52*   AST 41*   ALKPHOS 89   BILITOT 1.5*   PROT 7.3   ALBUMIN 3.4*     Lactic acid levels reviewed  Coagulation: No results for input(s): LABPROT, INR, APTT in the last 168 hours.  Cardiac markers: No results for input(s): CKMB, CPKMB, TROPONINT, TROPONINI, MYOGLOBIN in the last 168 hours.  ABGs: No results for input(s): PH, PCO2, PO2, HCO3, POCSATURATED, BE in the last 168 hours.    Significant Diagnostics:      EXAMINATION:  CT ABDOMEN PELVIS WITHOUT CONTRAST    CLINICAL HISTORY:  Abdominal  Pain;    FINDINGS:  There is some scarring in both lung bases.  Atelectatic change also noted with some pleural thickening which may be chronic.  See the liver and spleen is normal.  There are stones in the gallbladder.  The pancreas appears normal.  The right adrenal gland is unremarkable.  There are surgical clips in the expected location of the left adrenal gland.  No kidney stones or hydronephrosis.  The aorta is atherosclerotic.  No adenopathy or free fluid.  Multiple dilated loops of small bowel noted involving the upper abdomen with collapsed distal small bowel.  As she has all the is there is nonspecific thickening of the wall of the sigmoid and rectum.  This is there is also thickening of the wall of the urinary bladder.  Compression deformities involving the L3 and L5 bodies.  Postoperative changes of the thoracolumbar spine.  Baclofen pump in the right gluteal region.  Postop changes left pelvis.   Impression       Multiple dilated loops of small bowel in the upper abdomen with collapsed distal small bowel suggesting partial or developing small bowel obstruction.  Cholelithiasis.  Atelectatic change in both lung bases with pleural thickening along with some pulmonary parenchymal scarring.  Thickening of the wall of the rectum and sigmoid raising the question of colitis.  Thickening of the wall of the urinary bladder.    All CT scans at this facility use dose modulation, iterative reconstruction, and/or weight based dosing when appropriate to reduce radiation dose to as low as reasonably achievable.      Electronically signed by: Genaro Isabel MD  Date: 06/06/2018  Time: 22:17

## 2018-06-07 NOTE — ED NOTES
Pt resting with eyes closed, awakens easily to verbal stimuli, resp e/u, normal sinus rhythm on monitor, skin warm and dry, nad. Pt aware of poc. Will continue to monitor.

## 2018-06-07 NOTE — H&P
[facility] Hospital Medicine  History & Physical    Patient Name: Vinicius Sung  MRN: [id number]  Admission Date: 6/6/2018  Attending Physician: Eric Monge MD   Primary Care Provider: Vj Gonzalez MD      Patient information was obtained from patient and ER records.     Subjective:     Principal Problem:Severe sepsis    Chief Complaint:   Chief Complaint   Patient presents with    Vomiting     symptoms x past 3 days, Pepto Bismol taken with little relief    Diarrhea    Abdominal Pain        HPI: Vinicius Sung is a 67 year old male with history of COPD, CAD, and PAD who presented from [facility] Emergency Department for abdominal pain that began 3 days ago. Associated symptoms include diarrhea, decreased appetite, vomiting, and metallic taste. He denies fever, chills, and chest pain. CT Chest showed evidence of colitis involving rectum and sigmoid and possible partial small bowel obstruction. He was subsequently transferred for further work up. Patient reports abdominal pain has significantly improved. No further episodes of diarrhea and vomiting. He remains NPO.       Past Medical History:   Diagnosis Date    CAD (coronary artery disease)     COPD (chronic obstructive pulmonary disease)     Coronary artery disease     GERD (gastroesophageal reflux disease)     Major depressive disorder     PAD (peripheral artery disease)     ST elevation (STEMI) myocardial infarction     Myocardial Infarction       Past Surgical History:   Procedure Laterality Date    BACK SURGERY      CARDIAC SURGERY      [place] AMPUTATION LOW LEG THRU TIB/FIB      Amputation, Below The Knee       Review of patient's allergies indicates:  No Known Allergies    No current facility-administered medications on file prior to encounter.      No current outpatient prescriptions on file prior to encounter.     Family History     None        Social History Main Topics    Smoking status: Former Smoker     Types:  Cigarettes    Smokeless tobacco: Not on file      Comment: 3ppd smoker x 35yrs, recently quit in past week or so    Alcohol use No      Comment: quit 14 yrs ago    Drug use: No    Sexual activity: Not on file     Review of Systems   Constitutional: Positive for fatigue. Negative for activity change, appetite change and chills.   HENT: Negative.  Negative for congestion, drooling, hearing loss and postnasal drip.         Bad taste in his mouth   Eyes: Negative.    Respiratory: Negative for cough, chest tightness and shortness of breath.    Cardiovascular: Negative.  Negative for chest pain and leg swelling.   Gastrointestinal: Positive for abdominal distention, abdominal pain, diarrhea, nausea and vomiting. Negative for anal bleeding and blood in stool.   Endocrine: Negative.  Negative for cold intolerance, polydipsia and polyuria.   Genitourinary: Negative.  Negative for discharge, dysuria, frequency and penile pain.   Musculoskeletal: Negative.  Negative for back pain, gait problem and joint swelling.   Skin: Negative.  Negative for color change and rash.   Allergic/Immunologic: Negative.    Neurological: Negative.    Hematological: Negative.    Psychiatric/Behavioral: Negative.       Objective:     Vital Signs (Most Recent):  Temp: 98 °F (36.7 °C) (06/07/18 1305)  Pulse: 96 (06/07/18 1305)  Resp: 18 (06/07/18 1305)  BP: 118/72 (06/07/18 1305)  SpO2: 95 % (06/07/18 1305) Vital Signs (24h Range):  Temp:  [98 °F (36.7 °C)-98.7 °F (37.1 °C)] 98 °F (36.7 °C)  Pulse:  [] 96  Resp:  [18-27] 18  SpO2:  [91 %-98 %] 95 %  BP: ()/(57-74) 118/72     Weight: 90.7 kg (200 lb)  Body mass index is 27.12 kg/m².    Physical Exam   Constitutional: He is oriented to person, place, and time.   HENT:   Head: Normocephalic and atraumatic.   Neck: Normal range of motion. Neck supple. No tracheal deviation present. No thyromegaly present.   Cardiovascular: Normal rate and regular rhythm.    Pulmonary/Chest: Effort normal  and breath sounds normal.   Abdominal: Soft. He exhibits distension/tenderness.There is no rebound and no guarding. No hernia.   Musculoskeletal: Normal range of motion.   Lymphadenopathy:     He has no cervical adenopathy.   Neurological: He is oriented to person, place, and time.   Skin: Skin is warm. Capillary refill takes less than 2 seconds.   Psychiatric: He has a normal mood and affect. His behavior is normal. Judgment and thought content normal.   Vitals reviewed.    Significant Labs:   CBC:   Recent Labs  Lab 06/06/18 2028 06/07/18  0543   WBC 24.80* 14.08*   HGB 16.4 13.1*   HCT 49.7 40.1    175     CMP:   Recent Labs  Lab 06/06/18 2106 06/07/18  0832   * 135*   K 5.0 4.1   CL 98 105   CO2 24 24   * 93   BUN 33* 27*   CREATININE 1.7* 1.3   CALCIUM 8.3* 7.5*   PROT 7.3 5.9*   ALBUMIN 3.4* 2.8*   BILITOT 1.5* 0.9   ALKPHOS 89 79   AST 41* 31   ALT 52* 35   ANIONGAP 13 6*   EGFRNONAA 40.5* 56*       Significant Imaging:  Imaging Results          US Abdomen Limited (Final result)  Result time 06/07/18 09:43:31    Final result by Carlton Maradiaga MD (06/07/18 09:43:31)                 Impression:      Fatty infiltration liver.  Limited study.    Gallbladder is not seen and on CT is contracted and calcified.      Electronically signed by: Carlton Maradiaga MD  Date:    06/07/2018  Time:    09:43             Narrative:    EXAMINATION:  US ABDOMEN LIMITED    CLINICAL HISTORY:  abdominal pain, elevated LFTs, sepsis;    COMPARISON:  06/06/2018    FINDINGS:  Limited right upper quadrant ultrasound performed.  The liver measures 14.7 cm in length and demonstrates increased echogenicity.  Common bile duct is within normal limits at 3.5 mm..  Gallbladder is not seen and on CT is contracted and calcified.  Pancreas is obscured by bowel gas.  The right kidney is normal in length measuring 11.4 cm. No right sided hydronephrosis or renal masses identified.                               CT Abdomen Pelvis   Without Contrast (Final result)  Result time 06/06/18 22:17:25   Procedure changed from CT Abdomen Pelvis With Contrast     Final result by Genaro Isabel MD (06/06/18 22:17:25)                 Impression:      Multiple dilated loops of small bowel in the upper abdomen with collapsed distal small bowel suggesting partial or developing small bowel obstruction.  Cholelithiasis.  Atelectatic change in both lung bases with pleural thickening along with some pulmonary parenchymal scarring.  Thickening of the wall of the rectum and sigmoid raising the question of colitis.  Thickening of the wall of the urinary bladder.    All CT scans at this facility use dose modulation, iterative reconstruction, and/or weight based dosing when appropriate to reduce radiation dose to as low as reasonably achievable.      Electronically signed by: Genaro Isabel MD  Date:    06/06/2018  Time:    22:17             Narrative:    EXAMINATION:  CT ABDOMEN PELVIS WITHOUT CONTRAST    CLINICAL HISTORY:  Abdominal Pain;    FINDINGS:  There is some scarring in both lung bases.  Atelectatic change also noted with some pleural thickening which may be chronic.  See the liver and spleen is normal.  There are stones in the gallbladder.  The pancreas appears normal.  The right adrenal gland is unremarkable.  There are surgical clips in the expected location of the left adrenal gland.  No kidney stones or hydronephrosis.  The aorta is atherosclerotic.  No adenopathy or free fluid.  Multiple dilated loops of small bowel noted involving the upper abdomen with collapsed distal small bowel.  As she has all the is there is nonspecific thickening of the wall of the sigmoid and rectum.  This is there is also thickening of the wall of the urinary bladder.  Compression deformities involving the L3 and L5 bodies.  Postoperative changes of the thoracolumbar spine.  Baclofen pump in the right gluteal region.  Postop changes left pelvis.                                X-Ray Chest AP Portable (Final result)  Result time 06/06/18 20:53:11    Final result by Genaro Isabel MD (06/06/18 20:53:11)                 Impression:      Infiltrate lateral right midlung.    All CT scans at this facility use dose modulation, iterative reconstruction, and/or weight based dosing when appropriate to reduce radiation dose to as low as reasonably achievable.      Electronically signed by: Genaro Isabel MD  Date:    06/06/2018  Time:    20:53             Narrative:    EXAMINATION:  XR CHEST AP PORTABLE    CLINICAL HISTORY:  Cough;    COMPARISON:  06/15/2017.    FINDINGS:  The heart is normal in size.  Sternotomy wires.  There is scarring in the lung bases.  Infiltrate in the lateral right mid lung.                                  Assessment/Plan:     * Severe sepsis    --Leukocytosis, initial lactic acid>2, tachypnea, tachycardia  --CT findings concerning for colitis  --IV abx\      Colitis    --IV Flagyl and Ciprofloxacin  --Stool studies pending  --reactive ileus likely related colitis   --Bowel rest for now  --KUB today        Acute renal failure    -Improved with IVF's.        COPD (chronic obstructive pulmonary disease)    -Duonebs        CAD (coronary artery disease)    -hold ASA, Plavix, and STATIN          VTE Risk Mitigation     SCD's        Ian Leon NP  Department of Hospital Medicine   [facility]

## 2018-06-07 NOTE — HPI
Patient reports he developed nausea vomiting and diarrhea all started about Monday.  This was associated with a bad taste in his mouth.  He developed abdominal distention and some cramping pain. He presented to the emergency room where CT scan showed some dilated loops of small bowel as well as thickening of the colon.  He had an elevated white count and a mild increase in his lactic acid.    Patient was treated with IV fluids and antibiotics.    He states he is feeling better this morning

## 2018-06-07 NOTE — ED NOTES
Dr. Mcadams is at the bedside updating pt on test results and poc. Pt has verbalized understanding, and he states that he is fine with being transferred to [facility] for admission.

## 2018-06-07 NOTE — ED NOTES
Pt sleeping, md YULIYA is aware of pt's O2 sat (hx of COPD), sinus tach on cardiac monitor, skin warm and dry, nad. Will continue to monitor.

## 2018-06-07 NOTE — ASSESSMENT & PLAN NOTE
--IV Flagyl and Ciprofloxacin  --Stool studies pending  --reactive ileus likely related colitis   --Bowel rest for now  --KUB today

## 2018-06-07 NOTE — ASSESSMENT & PLAN NOTE
--Leukocytosis, initial lactic acid>2, tachypnea, tachycardia  --CT findings concerning for colitis  --IV abx

## 2018-06-07 NOTE — ED NOTES
Pt watching tv, aaoX4, sinus tach on cardiac monitor, resp e/u, nad. Md is aware of pt's O2 sat. Pt has a hx of COPD. Pt is aware of poc. Bed locked in lowest position, side rails up X2, call bell in reach, blanket given and lights turned down for comfort. Will continue to monitor.

## 2018-06-07 NOTE — ED NOTES
[facility] has left [facility] ED to transport pt to [facility] at this time.

## 2018-06-07 NOTE — ED PROVIDER NOTES
Encounter Date: 6/6/2018       History     Chief Complaint   Patient presents with    Vomiting     symptoms x past 3 days, Pepto Bismol taken with little relief    Diarrhea    Abdominal Pain     Patient currently presents with complaint of abdominal pain.  Gradual onset of this event was first noted 2 - 3 days ago.  This is localized to the generalized.  This discomfort is described as sharp in nature.  There have been loose stools over the last few days.  There has been associated emesis.  There are not associated urinary complaints.  This is not a recurring problem.  Patient denies fever.            Review of patient's allergies indicates:  No Known Allergies  Past Medical History:   Diagnosis Date    CAD (coronary artery disease)     COPD (chronic obstructive pulmonary disease)     Coronary artery disease     GERD (gastroesophageal reflux disease)     Major depressive disorder     PAD (peripheral artery disease)     ST elevation (STEMI) myocardial infarction     Myocardial Infarction     Past Surgical History:   Procedure Laterality Date    BACK SURGERY      CARDIAC SURGERY      [place] AMPUTATION LOW LEG THRU TIB/FIB      Amputation, Below The Knee     History reviewed. No pertinent family history.  Social History   Substance Use Topics    Smoking status: Former Smoker     Types: Cigarettes    Smokeless tobacco: Not on file      Comment: 3ppd smoker x 35yrs, recently quit in past week or so    Alcohol use No      Comment: quit 14 yrs ago     Review of Systems   Constitutional: Negative for chills and fever.   HENT: Negative for congestion.    Respiratory: Negative for chest tightness and shortness of breath.    Cardiovascular: Negative for chest pain and leg swelling.   Gastrointestinal: Positive for abdominal distention, abdominal pain, diarrhea, nausea and vomiting. Negative for constipation.   Genitourinary: Negative for dysuria, frequency and urgency.   Skin: Negative for color change and rash.    Allergic/Immunologic: Negative for immunocompromised state.   Neurological: Negative for weakness and numbness.   Hematological: Negative for adenopathy. Does not bruise/bleed easily.   All other systems reviewed and are negative.      Physical Exam     Initial Vitals [06/06/18 2024]   BP Pulse Resp Temp SpO2   104/66 105 (!) 22 98 °F (36.7 °C) 95 %      MAP       78.67         Vitals:    06/06/18 2024 06/06/18 2041 06/06/18 2051 06/06/18 2053   BP: 104/66  100/65 103/61   Pulse: 105 (!) 112 110 (!) 112   Resp: (!) 22      Temp: 98 °F (36.7 °C)      TempSrc: Oral      SpO2: 95%      Weight: 90.7 kg (200 lb)      Height: 6' (1.829 m)       06/06/18 2055 06/06/18 2105 06/06/18 2117 06/06/18 2217   BP: (!) 97/58 106/65 (!) 131/57 (!) 110/58   Pulse: (!) 120 106 102 105   Resp:  (!) 27 (!) 22 (!) 24   Temp:       TempSrc:       SpO2:  (!) 92% (!) 94% (!) 94%   Weight:       Height:        06/06/18 2232 06/06/18 2240 06/06/18 2332   BP: (!) 146/63  113/64   Pulse: 105  104   Resp: (!) 21  (!) 23   Temp:  98.7 °F (37.1 °C)    TempSrc:  Oral    SpO2: (!) 93%  (!) 93%   Weight:      Height:            Physical Exam    Nursing note and vitals reviewed.  Constitutional: He appears well-developed and well-nourished. He is not diaphoretic. No distress.   HENT:   Head: Normocephalic and atraumatic.   Right Ear: External ear normal.   Left Ear: External ear normal.   Nose: Nose normal.   Mouth/Throat: Oropharynx is clear and moist.   Eyes: Conjunctivae and EOM are normal. Pupils are equal, round, and reactive to light. No scleral icterus.   Neck: Neck supple. No JVD present.   Cardiovascular: Normal rate, regular rhythm, normal heart sounds and intact distal pulses. Exam reveals no gallop and no friction rub.    No murmur heard.  Pulmonary/Chest: Breath sounds normal. No respiratory distress. He has no wheezes. He has no rhonchi. He has no rales.   Abdominal: Soft. Bowel sounds are normal. He exhibits distension. There is  tenderness. There is rebound and guarding.   Musculoskeletal: Normal range of motion. He exhibits no edema.   Neurological: He is alert and oriented to person, place, and time.   Skin: Skin is warm and dry. No rash noted.   Psychiatric: He has a normal mood and affect. His behavior is normal.         ED Course   Procedures  Labs Reviewed   LACTIC ACID, PLASMA - Abnormal; Notable for the following:        Result Value    Lactate (Lactic Acid) 3.4 (*)     All other components within normal limits   CBC W/ AUTO DIFFERENTIAL - Abnormal; Notable for the following:     WBC 24.80 (*)     RDW 16.4 (*)     Gran% 86.0 (*)     Lymph% 5.0 (*)     Mono% 3.0 (*)     All other components within normal limits   URINALYSIS - Abnormal; Notable for the following:     Color, UA Orange (*)     Specific Gravity, UA >=1.030 (*)     Protein, UA Trace (*)     Bilirubin (UA) 1+ (*)     All other components within normal limits   COMPREHENSIVE METABOLIC PANEL - Abnormal; Notable for the following:     Sodium 135 (*)     Glucose 122 (*)     BUN, Bld 33 (*)     Creatinine 1.7 (*)     Calcium 8.3 (*)     Albumin 3.4 (*)     Total Bilirubin 1.5 (*)     AST 41 (*)     ALT 52 (*)     eGFR if  46.9 (*)     eGFR if non  40.5 (*)     All other components within normal limits   CULTURE, BLOOD   CULTURE, BLOOD   CULTURE, STOOL   CLOSTRIDIUM DIFFICILE   LACTIC ACID, PLASMA   WBC, STOOL     EKG Readings: (Independently Interpreted)   Initial Reading: No STEMI. Rhythm: Sinus Tachycardia. Heart Rate: 112. Ectopy: No Ectopy. Conduction: Normal. Axis: Normal.       CT Abdomen Pelvis  Without Contrast   Final Result      Multiple dilated loops of small bowel in the upper abdomen with collapsed distal small bowel suggesting partial or developing small bowel obstruction.  Cholelithiasis.  Atelectatic change in both lung bases with pleural thickening along with some pulmonary parenchymal scarring.  Thickening of the wall of the  rectum and sigmoid raising the question of colitis.  Thickening of the wall of the urinary bladder.      All CT scans at this facility use dose modulation, iterative reconstruction, and/or weight based dosing when appropriate to reduce radiation dose to as low as reasonably achievable.         Electronically signed by: Genaro Isabel MD   Date:    06/06/2018   Time:    22:17      X-Ray Chest AP Portable   Final Result      Infiltrate lateral right midlung.      All CT scans at this facility use dose modulation, iterative reconstruction, and/or weight based dosing when appropriate to reduce radiation dose to as low as reasonably achievable.         Electronically signed by: Genaro Isabel MD   Date:    06/06/2018   Time:    20:53           Medical Decision Making:   ED Management:  All historical, clinical, radiographic, and laboratory findings were reviewed with the patient/family in detail along with the indications for transport to the facility in [place] in order to receive IV ABX, IVF, cardiac monitoring, and surgical consultation.  All remaining questions and concerns were addressed at this time and the patient/family communicates understanding and agrees to proceed accordingly.  Similarly, all pertinent details of the encounter were discussed with Dr. Medina via NP Idalia Weinstein who agrees to receive the patient at [facility] for further care as outlined above.  Additionally discussed the pertinent details of the encounter with Dr. Mariee of [facility] surgery.  I reviewed the findings from all lab results, imaging, and exam findings concerning for peritonitis.  He notes that he will evaluate the patient in consultation.  The patient will be transferred by [facility] ambulance services secondary to a need for ongoing cardiac monitoring and IVF en route.                            Clinical Impression:   The primary encounter diagnosis was Severe sepsis. Diagnoses of CHRIS (acute kidney injury), Lactic  acidosis, Pneumonia of right middle lobe due to infectious organism, Elevated liver function tests, and Colitis were also pertinent to this visit.                             Steve Mcadams MD  06/06/18 2271

## 2018-06-07 NOTE — ED NOTES
Pt presents from [facility]. Pt aaox3 and in nad. Pt states he has been having abdominal pain with n/v//d. Pt assisted with using urinal on arrival. Pt with urine output of 450ml clear yellow urine. Pt placed on cardiac monitor, cont. And auto bp cuff

## 2018-06-08 PROBLEM — N17.9 ACUTE RENAL FAILURE: Status: RESOLVED | Noted: 2018-06-07 | Resolved: 2018-06-08

## 2018-06-08 PROBLEM — K82.8 CALCIFICATION OF GALLBLADDER: Status: ACTIVE | Noted: 2018-06-08

## 2018-06-08 LAB
ALBUMIN SERPL BCP-MCNC: 2.9 G/DL
ALP SERPL-CCNC: 86 U/L
ALT SERPL W/O P-5'-P-CCNC: 31 U/L
ANION GAP SERPL CALC-SCNC: 8 MMOL/L
ANION GAP SERPL CALC-SCNC: 8 MMOL/L
AST SERPL-CCNC: 25 U/L
BASOPHILS # BLD AUTO: 0.03 K/UL
BASOPHILS NFR BLD: 0.2 %
BILIRUB SERPL-MCNC: 0.8 MG/DL
BUN SERPL-MCNC: 14 MG/DL
BUN SERPL-MCNC: 14 MG/DL
CALCIUM SERPL-MCNC: 7.8 MG/DL
CALCIUM SERPL-MCNC: 7.8 MG/DL
CHLORIDE SERPL-SCNC: 105 MMOL/L
CHLORIDE SERPL-SCNC: 105 MMOL/L
CO2 SERPL-SCNC: 20 MMOL/L
CO2 SERPL-SCNC: 20 MMOL/L
CREAT SERPL-MCNC: 1.1 MG/DL
CREAT SERPL-MCNC: 1.1 MG/DL
DIFFERENTIAL METHOD: ABNORMAL
EOSINOPHIL # BLD AUTO: 0.2 K/UL
EOSINOPHIL NFR BLD: 1.4 %
ERYTHROCYTE [DISTWIDTH] IN BLOOD BY AUTOMATED COUNT: 16 %
EST. GFR  (AFRICAN AMERICAN): >60 ML/MIN/1.73 M^2
EST. GFR  (AFRICAN AMERICAN): >60 ML/MIN/1.73 M^2
EST. GFR  (NON AFRICAN AMERICAN): >60 ML/MIN/1.73 M^2
EST. GFR  (NON AFRICAN AMERICAN): >60 ML/MIN/1.73 M^2
GLUCOSE SERPL-MCNC: 115 MG/DL
GLUCOSE SERPL-MCNC: 115 MG/DL
HCT VFR BLD AUTO: 39.6 %
HGB BLD-MCNC: 12.8 G/DL
LYMPHOCYTES # BLD AUTO: 0.6 K/UL
LYMPHOCYTES NFR BLD: 4.8 %
MAGNESIUM SERPL-MCNC: 2.3 MG/DL
MCH RBC QN AUTO: 28.5 PG
MCHC RBC AUTO-ENTMCNC: 32.3 G/DL
MCV RBC AUTO: 88 FL
MONOCYTES # BLD AUTO: 1.1 K/UL
MONOCYTES NFR BLD: 8.5 %
NEUTROPHILS # BLD AUTO: 11.1 K/UL
NEUTROPHILS NFR BLD: 85.1 %
PHOSPHATE SERPL-MCNC: 1.6 MG/DL
PLATELET # BLD AUTO: 163 K/UL
PMV BLD AUTO: 10.9 FL
POTASSIUM SERPL-SCNC: 4.1 MMOL/L
POTASSIUM SERPL-SCNC: 4.1 MMOL/L
PROT SERPL-MCNC: 6.3 G/DL
RBC # BLD AUTO: 4.49 M/UL
SODIUM SERPL-SCNC: 133 MMOL/L
SODIUM SERPL-SCNC: 133 MMOL/L
WBC # BLD AUTO: 13.08 K/UL
WBC #/AREA STL HPF: NORMAL /[HPF]

## 2018-06-08 PROCEDURE — 36415 COLL VENOUS BLD VENIPUNCTURE: CPT

## 2018-06-08 PROCEDURE — 83735 ASSAY OF MAGNESIUM: CPT

## 2018-06-08 PROCEDURE — 84100 ASSAY OF PHOSPHORUS: CPT

## 2018-06-08 PROCEDURE — 21400001 HC TELEMETRY ROOM

## 2018-06-08 PROCEDURE — 63600175 PHARM REV CODE 636 W HCPCS: Performed by: EMERGENCY MEDICINE

## 2018-06-08 PROCEDURE — 94761 N-INVAS EAR/PLS OXIMETRY MLT: CPT

## 2018-06-08 PROCEDURE — S5010 5% DEXTROSE AND 0.45% SALINE: HCPCS | Performed by: EMERGENCY MEDICINE

## 2018-06-08 PROCEDURE — 80053 COMPREHEN METABOLIC PANEL: CPT

## 2018-06-08 PROCEDURE — 63600175 PHARM REV CODE 636 W HCPCS: Performed by: NURSE PRACTITIONER

## 2018-06-08 PROCEDURE — 99223 1ST HOSP IP/OBS HIGH 75: CPT | Mod: ,,, | Performed by: SURGERY

## 2018-06-08 PROCEDURE — 27000221 HC OXYGEN, UP TO 24 HOURS

## 2018-06-08 PROCEDURE — 63600175 PHARM REV CODE 636 W HCPCS: Performed by: INTERNAL MEDICINE

## 2018-06-08 PROCEDURE — 25000003 PHARM REV CODE 250: Performed by: EMERGENCY MEDICINE

## 2018-06-08 PROCEDURE — 85025 COMPLETE CBC W/AUTO DIFF WBC: CPT

## 2018-06-08 PROCEDURE — 25000003 PHARM REV CODE 250: Performed by: NURSE PRACTITIONER

## 2018-06-08 PROCEDURE — S0030 INJECTION, METRONIDAZOLE: HCPCS | Performed by: NURSE PRACTITIONER

## 2018-06-08 PROCEDURE — 63600175 PHARM REV CODE 636 W HCPCS: Performed by: HOSPITALIST

## 2018-06-08 RX ORDER — CIPROFLOXACIN 2 MG/ML
400 INJECTION, SOLUTION INTRAVENOUS
Status: DISCONTINUED | OUTPATIENT
Start: 2018-06-09 | End: 2018-06-10 | Stop reason: ALTCHOICE

## 2018-06-08 RX ADMIN — CEFEPIME HYDROCHLORIDE 2 G: 2 INJECTION, SOLUTION INTRAVENOUS at 06:06

## 2018-06-08 RX ADMIN — CIPROFLOXACIN 400 MG: 2 INJECTION, SOLUTION INTRAVENOUS at 08:06

## 2018-06-08 RX ADMIN — AZITHROMYCIN MONOHYDRATE 500 MG: 500 INJECTION, POWDER, LYOPHILIZED, FOR SOLUTION INTRAVENOUS at 08:06

## 2018-06-08 RX ADMIN — CIPROFLOXACIN 400 MG: 2 INJECTION, SOLUTION INTRAVENOUS at 02:06

## 2018-06-08 RX ADMIN — MORPHINE SULFATE 2 MG: 2 INJECTION, SOLUTION INTRAMUSCULAR; INTRAVENOUS at 08:06

## 2018-06-08 RX ADMIN — ONDANSETRON 4 MG: 2 INJECTION INTRAMUSCULAR; INTRAVENOUS at 08:06

## 2018-06-08 RX ADMIN — METRONIDAZOLE 500 MG: 500 INJECTION, SOLUTION INTRAVENOUS at 02:06

## 2018-06-08 RX ADMIN — CEFEPIME HYDROCHLORIDE 2 G: 2 INJECTION, SOLUTION INTRAVENOUS at 04:06

## 2018-06-08 RX ADMIN — DEXTROSE AND SODIUM CHLORIDE: 5; .45 INJECTION, SOLUTION INTRAVENOUS at 11:06

## 2018-06-08 RX ADMIN — METRONIDAZOLE 500 MG: 500 INJECTION, SOLUTION INTRAVENOUS at 08:06

## 2018-06-08 RX ADMIN — METRONIDAZOLE 500 MG: 500 INJECTION, SOLUTION INTRAVENOUS at 11:06

## 2018-06-08 NOTE — ASSESSMENT & PLAN NOTE
--Leukocytosis, initial lactic acid>2, tachypnea, tachycardia  --CT findings concerning for colitis  --IV abx    6/8  Resolving  Lactivc Acid 1.5  WBC 13.08 vs 24.8  ABX  Fluids

## 2018-06-08 NOTE — SUBJECTIVE & OBJECTIVE
Interval History: less,but not resolved abdominal pain    Medications:  Continuous Infusions:   dextrose 5 % and 0.45 % NaCl 150 mL/hr at 06/07/18 1958     Scheduled Meds:   azithromycin  500 mg Intravenous Q24H    ceFEPime (MAXIPIME) IVPB  2 g Intravenous Q12H    ciprofloxacin (CIPRO)400mg/200ml D5W IVPB  400 mg Intravenous Q8H    metronidazole  500 mg Intravenous Q8H     PRN Meds:morphine, ondansetron     Review of patient's allergies indicates:  No Known Allergies  Objective:     Vital Signs (Most Recent):  Temp: 96.2 °F (35.7 °C) (06/08/18 0723)  Pulse: 79 (06/08/18 0723)  Resp: 20 (06/08/18 0723)  BP: (!) 149/69 (06/08/18 0723)  SpO2: 96 % (06/08/18 0750) Vital Signs (24h Range):  Temp:  [96.2 °F (35.7 °C)-99.6 °F (37.6 °C)] 96.2 °F (35.7 °C)  Pulse:  [79-99] 79  Resp:  [18-27] 20  SpO2:  [91 %-97 %] 96 %  BP: (118-161)/(65-79) 149/69     Weight: 101.5 kg (223 lb 12.3 oz)  Body mass index is 30.35 kg/m².    Intake/Output - Last 3 Shifts       06/06 0700 - 06/07 0659 06/07 0700 - 06/08 0659 06/08 0700 - 06/09 0659    I.V. (mL/kg)  4535 (44.7)     IV Piggyback 2971 1150     Total Intake(mL/kg) 2971 (32.8) 5685 (56)     Urine (mL/kg/hr)  3150 (1.3)     Total Output   3150      Net +2971 +2535                   Physical Exam   Constitutional: He is oriented to person, place, and time.   Chronically ill  Dyspnea on exertion   Eyes: No scleral icterus.   Neck: Normal range of motion.   Cardiovascular: Normal rate, regular rhythm and normal heart sounds.    Pulmonary/Chest: Effort normal and breath sounds normal. No respiratory distress. He has no wheezes.   Poor air flow   Abdominal: Soft. Bowel sounds are normal. Distention: mild. There is tenderness (mild generalized).   Musculoskeletal: He exhibits no edema.   Neurological: He is alert and oriented to person, place, and time.   Skin: Skin is warm and dry. Capillary refill takes less than 2 seconds.   Psychiatric: He has a normal mood and affect. His behavior  is normal. Judgment and thought content normal.   Vitals reviewed.      Significant Labs:  CBC:   Recent Labs  Lab 06/08/18  0600   WBC 13.08*   RBC 4.49*   HGB 12.8*   HCT 39.6*      MCV 88   MCH 28.5   MCHC 32.3     BMP:   Recent Labs  Lab 06/08/18  0600   *  115*   *  133*   K 4.1  4.1     105   CO2 20*  20*   BUN 14  14   CREATININE 1.1  1.1   CALCIUM 7.8*  7.8*   MG 2.3     CMP:   Recent Labs  Lab 06/08/18  0600   *  115*   CALCIUM 7.8*  7.8*   ALBUMIN 2.9*   PROT 6.3   *  133*   K 4.1  4.1   CO2 20*  20*     105   BUN 14  14   CREATININE 1.1  1.1   ALKPHOS 86   ALT 31   AST 25   BILITOT 0.8     Microbiology Results (last 7 days)     Procedure Component Value Units Date/Time    Blood culture [[id number]] Collected:  06/06/18 2046    Order Status:  Completed Specimen:  Blood from Peripheral, Forearm, Right Updated:  06/08/18 0812     Blood Culture, Routine No Growth to date     Blood Culture, Routine No Growth to date    Blood culture [[id number]] Collected:  06/06/18 2049    Order Status:  Completed Specimen:  Blood from Peripheral, Hand, Left Updated:  06/08/18 0812     Blood Culture, Routine No Growth to date     Blood Culture, Routine No Growth to date    Stool culture **CANNOT BE ORDERED STAT** [[id number]] Collected:  06/07/18 1801    Order Status:  Sent Specimen:  Stool from Stool Updated:  06/08/18 0530    E. coli 0157 antigen [[id number]] Collected:  06/07/18 1801    Order Status:  No result Specimen:  Stool from Stool Updated:  06/08/18 0530    Clostridium difficile EIA [[id number]] Collected:  06/07/18 1801    Order Status:  Canceled Specimen:  Stool from Stool Updated:  06/07/18 1822          Significant Diagnostics:        EXAMINATION:  XR ABDOMEN FLAT AND ERECT    CLINICAL HISTORY:  Follow-up on small bowel distention from CT scan;    COMPARISON:  KUB performed on 06/07/2018.    FINDINGS:  There are several dilated loops of small bowel in the  expected location of the jejunum.  One of the larger ones is in the left lower quadrant of the abdomen as a diameter of 3.6 cm.  On the prior examination it has a diameter of 4.2 cm.  There is no pneumoperitoneum.  There is posterior spinal fusion of where across the thoracolumbar portion of the spine.  There are several sternotomy wires visualized.  There is surgical hardware projected over the left iliac bone.  There are surgical clips projected over the left upper quadrant of the abdomen.   Impression       1. There are several dilated loops of small bowel in the expected location of the jejunum. One of the larger ones is in the left lower quadrant of the abdomen as a diameter of 3.6 cm. On the prior examination it has a diameter of 4.2 cm.  This represents an interval improvement in the appearance of the gastrointestinal system.  2. Surgical changes      Electronically signed by: Rick Leigh MD  Date: 06/08/2018  Time: 08:52

## 2018-06-08 NOTE — ASSESSMENT & PLAN NOTE
--IV Flagyl and Ciprofloxacin  --Stool studies pending  --reactive ileus likely related colitis   --Bowel rest for now  --KUB today    6/8  Improving  Bowel rest  ABX

## 2018-06-08 NOTE — PROGRESS NOTES
Pharmacist Renal Dose Adjustment Note    Vinicius Sung is a 67 y.o. male being treated with the medication Cipro.     Patient Data:  Vital Signs (Most Recent):  Temp: 96.2 °F (35.7 °C) (06/08/18 0723)  Pulse: 79 (06/08/18 0723)  Resp: 20 (06/08/18 0723)  BP: (!) 149/69 (06/08/18 0723)  SpO2: 96 % (06/08/18 0750)   Vital Signs (72h Range):  Temp:  [96.2 °F (35.7 °C)-99.6 °F (37.6 °C)]   Pulse:  []   Resp:  [18-27]   BP: ()/(57-79)   SpO2:  [91 %-98 %]        Recent Labs     Lab 06/06/18  2106 06/07/18  0832 06/08/18  0600   CREATININE 1.7* 1.3 1.1  1.1     Serum creatinine: 1.1 mg/dL 06/08/18 0600  Estimated creatinine clearance: 79.3 mL/min    Medication dose will be changed to 400 mg IV every 12 hours.     Pharmacist's Name: Kayleen Coronel  Pharmacist's Extension: [phone]

## 2018-06-08 NOTE — PROGRESS NOTES
[facility] Hospital Medicine  Progress Note    Patient Name: Vinicius Sung  MRN: [id number]  Patient Class: IP- Inpatient   Admission Date: 6/6/2018  Length of Stay: 1 days  Attending Physician: Eric Monge MD  Primary Care Provider: Vj Gonzalez MD        Subjective:     Principal Problem:Severe sepsis    HPI:  Vinicius Sung is a 67 year old male with history of COPD, CAD, and PAD who presented from [facility] Emergency Department for abdominal pain that began 3 days ago. Associated symptoms include diarrhea, decreased appetite, vomiting, and metallic taste. He denies fever, chills, and chest pain. CT Chest showed evidence of colitis involving rectum and sigmoid and possible partial small bowel obstruction. He was subsequently transferred for further work up. Patient reports abdominal pain has significantly improved. No further episodes of diarrhea and vomiting. He remains NPO.       Hospital Course:  6/8  Patient with improved symptoms. COntinues to experience abd discomfort. Patient with bowel sounds and evaluated by surgery who do not recommend surgery at present. Patient with bowel rest, npo. Patient no BM or flatus at present. Patient is belching. Patient denies CP / Vomiting / Nausea    Interval History: Improving slowly    Review of Systems   Constitutional: Negative for appetite change, fatigue and fever.   HENT: Negative.  Negative for congestion, sinus pressure and sore throat.    Eyes: Negative.  Negative for photophobia, discharge and visual disturbance.   Respiratory: Negative.  Negative for cough, chest tightness, shortness of breath and wheezing.    Cardiovascular: Negative.  Negative for chest pain and palpitations.   Gastrointestinal: Positive for abdominal distention, abdominal pain and nausea. Negative for blood in stool, constipation, diarrhea and vomiting.   Endocrine: Negative.    Genitourinary: Negative.    Musculoskeletal: Negative.  Negative for myalgias,  neck pain and neck stiffness.   Skin: Negative.    Allergic/Immunologic: Negative.    Neurological: Positive for weakness. Negative for seizures, syncope and headaches.   Hematological: Negative.    Psychiatric/Behavioral: Negative.  Negative for agitation, behavioral problems, hallucinations, self-injury and suicidal ideas.     Objective:     Vital Signs (Most Recent):  Temp: 96.2 °F (35.7 °C) (06/08/18 0723)  Pulse: 79 (06/08/18 0723)  Resp: 20 (06/08/18 0723)  BP: (!) 149/69 (06/08/18 0723)  SpO2: 96 % (06/08/18 0750) Vital Signs (24h Range):  Temp:  [96.2 °F (35.7 °C)-99.6 °F (37.6 °C)] 96.2 °F (35.7 °C)  Pulse:  [79-99] 79  Resp:  [18-27] 20  SpO2:  [91 %-97 %] 96 %  BP: (128-161)/(65-79) 149/69     Weight: 101.5 kg (223 lb 12.3 oz)  Body mass index is 30.35 kg/m².    Intake/Output Summary (Last 24 hours) at 06/08/18 1429  Last data filed at 06/08/18 0625   Gross per 24 hour   Intake             5685 ml   Output             3150 ml   Net             2535 ml      Physical Exam   Constitutional: He is oriented to person, place, and time. He appears well-developed and well-nourished.   HENT:   Head: Normocephalic and atraumatic.   Eyes: EOM are normal. Pupils are equal, round, and reactive to light.   Neck: Normal range of motion. Neck supple.   Cardiovascular: Normal rate, regular rhythm and intact distal pulses.    Murmur heard.  Pulmonary/Chest: Effort normal. Tachypnea noted. No respiratory distress. He has decreased breath sounds in the left upper field. He has wheezes.   Abdominal: Soft. Bowel sounds are normal. He exhibits distension. He exhibits no mass. There is tenderness. There is guarding. No hernia.   Musculoskeletal: Normal range of motion. He exhibits no deformity.   Neurological: He is alert and oriented to person, place, and time. He has normal reflexes.   Skin: Skin is warm and dry. Capillary refill takes less than 2 seconds.   Psychiatric: He has a normal mood and affect. His behavior is normal.  Judgment and thought content normal.   Nursing note and vitals reviewed.      Significant Labs:   ABGs: No results for input(s): PH, PCO2, HCO3, POCSATURATED, BE, TOTALHB, COHB, METHB, O2HB, POCFIO2 in the last 48 hours.  BMP:   Recent Labs  Lab 06/08/18  0600   *  115*   *  133*   K 4.1  4.1     105   CO2 20*  20*   BUN 14  14   CREATININE 1.1  1.1   CALCIUM 7.8*  7.8*   MG 2.3     CBC:   Recent Labs  Lab 06/06/18  2028 06/07/18  0543 06/08/18  0600   WBC 24.80* 14.08* 13.08*   HGB 16.4 13.1* 12.8*   HCT 49.7 40.1 39.6*    175 163     CMP:   Recent Labs  Lab 06/06/18  2106 06/07/18  0832 06/08/18  0600   * 135* 133*  133*   K 5.0 4.1 4.1  4.1   CL 98 105 105  105   CO2 24 24 20*  20*   * 93 115*  115*   BUN 33* 27* 14  14   CREATININE 1.7* 1.3 1.1  1.1   CALCIUM 8.3* 7.5* 7.8*  7.8*   PROT 7.3 5.9* 6.3   ALBUMIN 3.4* 2.8* 2.9*   BILITOT 1.5* 0.9 0.8   ALKPHOS 89 79 86   AST 41* 31 25   ALT 52* 35 31   ANIONGAP 13 6* 8  8   EGFRNONAA 40.5* 56* >60  >60     Lactic Acid:   Recent Labs  Lab 06/06/18  2046 06/06/18  2344 06/07/18  0340   LACTATE 3.4* 2.2 1.5     Troponin: No results for input(s): TROPONINI in the last 48 hours.  Urine Studies:   Recent Labs  Lab 06/06/18 2126   COLORU [place]*   APPEARANCEUA Clear   PHUR 6.0   SPECGRAV >=1.030*   PROTEINUA Trace*   GLUCUA Negative   KETONESU Negative   BILIRUBINUA 1+*   OCCULTUA Negative   NITRITE Negative   UROBILINOGEN Negative   LEUKOCYTESUR Negative     All pertinent labs within the past 24 hours have been reviewed.    Significant Imaging: I have reviewed all pertinent imaging results/findings within the past 24 hours.    Assessment/Plan:      * Severe sepsis    --Leukocytosis, initial lactic acid>2, tachypnea, tachycardia  --CT findings concerning for colitis  --IV abx    6/8  Resolving  Lactivc Acid 1.5  WBC 13.08 vs 24.8  ABX  Fluids        Calcification of gallbladder    Per surgery          COPD (chronic  obstructive pulmonary disease)    -Duonebs    6/8  Stable  Nebs  O2            CAD (coronary artery disease)    -hold ASA, Plavix, and STATIN        Colitis    --IV Flagyl and Ciprofloxacin  --Stool studies pending  --reactive ileus likely related colitis   --Bowel rest for now  --KUB today    6/8  Improving  Bowel rest  ABX          VTE Risk Mitigation         Ordered     IP VTE LOW RISK PATIENT  Once      06/07/18 1716     Place EARNESTINE hose  Until discontinued      06/07/18 1716     Place sequential compression device  Until discontinued      06/07/18 1716              Eric Monge MD  Department of Hospital Medicine   [facility]

## 2018-06-08 NOTE — SUBJECTIVE & OBJECTIVE
Interval History: Improving slowly    Review of Systems   Constitutional: Negative for appetite change, fatigue and fever.   HENT: Negative.  Negative for congestion, sinus pressure and sore throat.    Eyes: Negative.  Negative for photophobia, discharge and visual disturbance.   Respiratory: Negative.  Negative for cough, chest tightness, shortness of breath and wheezing.    Cardiovascular: Negative.  Negative for chest pain and palpitations.   Gastrointestinal: Positive for abdominal distention, abdominal pain and nausea. Negative for blood in stool, constipation, diarrhea and vomiting.   Endocrine: Negative.    Genitourinary: Negative.    Musculoskeletal: Negative.  Negative for myalgias, neck pain and neck stiffness.   Skin: Negative.    Allergic/Immunologic: Negative.    Neurological: Positive for weakness. Negative for seizures, syncope and headaches.   Hematological: Negative.    Psychiatric/Behavioral: Negative.  Negative for agitation, behavioral problems, hallucinations, self-injury and suicidal ideas.     Objective:     Vital Signs (Most Recent):  Temp: 96.2 °F (35.7 °C) (06/08/18 0723)  Pulse: 79 (06/08/18 0723)  Resp: 20 (06/08/18 0723)  BP: (!) 149/69 (06/08/18 0723)  SpO2: 96 % (06/08/18 0750) Vital Signs (24h Range):  Temp:  [96.2 °F (35.7 °C)-99.6 °F (37.6 °C)] 96.2 °F (35.7 °C)  Pulse:  [79-99] 79  Resp:  [18-27] 20  SpO2:  [91 %-97 %] 96 %  BP: (128-161)/(65-79) 149/69     Weight: 101.5 kg (223 lb 12.3 oz)  Body mass index is 30.35 kg/m².    Intake/Output Summary (Last 24 hours) at 06/08/18 1429  Last data filed at 06/08/18 0625   Gross per 24 hour   Intake             5685 ml   Output             3150 ml   Net             2535 ml      Physical Exam   Constitutional: He is oriented to person, place, and time. He appears well-developed and well-nourished.   HENT:   Head: Normocephalic and atraumatic.   Eyes: EOM are normal. Pupils are equal, round, and reactive to light.   Neck: Normal range of  motion. Neck supple.   Cardiovascular: Normal rate, regular rhythm and intact distal pulses.    Murmur heard.  Pulmonary/Chest: Effort normal. Tachypnea noted. No respiratory distress. He has decreased breath sounds in the left upper field. He has wheezes.   Abdominal: Soft. Bowel sounds are normal. He exhibits distension. He exhibits no mass. There is tenderness. There is guarding. No hernia.   Musculoskeletal: Normal range of motion. He exhibits no deformity.   Neurological: He is alert and oriented to person, place, and time. He has normal reflexes.   Skin: Skin is warm and dry. Capillary refill takes less than 2 seconds.   Psychiatric: He has a normal mood and affect. His behavior is normal. Judgment and thought content normal.   Nursing note and vitals reviewed.      Significant Labs:   ABGs: No results for input(s): PH, PCO2, HCO3, POCSATURATED, BE, TOTALHB, COHB, METHB, O2HB, POCFIO2 in the last 48 hours.  BMP:   Recent Labs  Lab 06/08/18  0600   *  115*   *  133*   K 4.1  4.1     105   CO2 20*  20*   BUN 14  14   CREATININE 1.1  1.1   CALCIUM 7.8*  7.8*   MG 2.3     CBC:   Recent Labs  Lab 06/06/18  2028 06/07/18  0543 06/08/18  0600   WBC 24.80* 14.08* 13.08*   HGB 16.4 13.1* 12.8*   HCT 49.7 40.1 39.6*    175 163     CMP:   Recent Labs  Lab 06/06/18  2106 06/07/18  0832 06/08/18  0600   * 135* 133*  133*   K 5.0 4.1 4.1  4.1   CL 98 105 105  105   CO2 24 24 20*  20*   * 93 115*  115*   BUN 33* 27* 14  14   CREATININE 1.7* 1.3 1.1  1.1   CALCIUM 8.3* 7.5* 7.8*  7.8*   PROT 7.3 5.9* 6.3   ALBUMIN 3.4* 2.8* 2.9*   BILITOT 1.5* 0.9 0.8   ALKPHOS 89 79 86   AST 41* 31 25   ALT 52* 35 31   ANIONGAP 13 6* 8  8   EGFRNONAA 40.5* 56* >60  >60     Lactic Acid:   Recent Labs  Lab 06/06/18  2046 06/06/18  2344 06/07/18  0340   LACTATE 3.4* 2.2 1.5     Troponin: No results for input(s): TROPONINI in the last 48 hours.  Urine Studies:   Recent Labs  Lab  06/06/18 2126   COLORU [place]*   APPEARANCEUA Clear   PHUR 6.0   SPECGRAV >=1.030*   PROTEINUA Trace*   GLUCUA Negative   KETONESU Negative   BILIRUBINUA 1+*   OCCULTUA Negative   NITRITE Negative   UROBILINOGEN Negative   LEUKOCYTESUR Negative     All pertinent labs within the past 24 hours have been reviewed.    Significant Imaging: I have reviewed all pertinent imaging results/findings within the past 24 hours.

## 2018-06-08 NOTE — PLAN OF CARE
Problem: Patient Care Overview  Goal: Individualization & Mutuality  Outcome: Ongoing (interventions implemented as appropriate)  Patient awake and alert, in NAD. Patient had uneventful shift. VS stable. Patient denies pain or SOB. Patient on telemetry. Patient instructed to turn Q2 hours.. Fall precautions in place. Bed locked and in lowest position. Yellow fall risk band and non-skid socks on patient. Patient free from fall/injury. Plan of care reviewed with patient. All questions answered. Will continue to monitor.

## 2018-06-08 NOTE — PLAN OF CARE
CM assessment complete. Pt lives at home alone and friendCecilia helps transport pt to appts or other services he needs. Pt uses a straight cane, rolling walker, rolator and wheelchair at home. Pt goes to pain management and he scheduled the appt for July 3,2018. He receives other prescribed medication by [facility] via mail.   CM name and contact number written on the white board. Discharge planning pamphlet and advance directive discussed and placed in the [employer] packet. Handoff to LATOYA Guzmán/JAMES. Payor: [employer].     06/08/18 2543   Discharge Assessment   Assessment Type Discharge Planning Assessment   Confirmed/corrected address and phone number on facesheet? Yes   Assessment information obtained from? Patient   Expected Length of Stay (days) (Unknown at this time)   Communicated expected length of stay with patient/caregiver yes   Prior to hospitilization cognitive status: Alert/Oriented   Prior to hospitalization functional status: Assistive Equipment   Current cognitive status: Alert/Oriented   Current Functional Status: Assistive Equipment   Facility Arrived From: (Home)   Lives With alone   Able to Return to Prior Arrangements yes   Is patient able to care for self after discharge? Yes   Who are your caregiver(s) and their phone number(s)? (Cecilia Byrd friend [phone])   Patient's perception of discharge disposition home or selfcare   Readmission Within The Last 30 Days no previous admission in last 30 days   Patient currently being followed by outpatient case management? No   Patient currently receives any other outside agency services? No   Equipment Currently Used at Home wheelchair;rollator;cane, straight;walker, rolling   Do you have any problems affording any of your prescribed medications? No   Is the patient taking medications as prescribed? yes   Does the patient have transportation home? Yes   Transportation Available family or friend will provide   Dialysis Name and Scheduled  days (n/a)   Does the patient receive services at the [facility]? No   Discharge Plan A Home Health   Discharge Plan B Skilled Nursing Facility   Patient/Family In Agreement With Plan yes

## 2018-06-08 NOTE — HOSPITAL COURSE
6/8  Patient with improved symptoms. COntinues to experience abd discomfort. Patient with bowel sounds and evaluated by surgery who do not recommend surgery at present. Patient with bowel rest, npo. Patient no BM or flatus at present. Patient is belching. Patient denies CP / Vomiting / Nausea  6/9  Advance diet as per surgery recommendation. Follow outpatient for calcification of gall bladder . Patient had only episode of loose stool in ed on presentation . Patient requesting for iv pain meds . Will resume his home pain meds   6/10  Patient was able to tolerate diet . Dr Pearson Surgery ok to discharge patient home and follow up outpatient . Will complete ten total days of antibotics for colitis . Patient was seen and examined stABLE TO DISCHARGE HOME .

## 2018-06-08 NOTE — PLAN OF CARE
Problem: Patient Care Overview  Goal: Plan of Care Review  Outcome: Ongoing (interventions implemented as appropriate)  The patient has been sinus rhythm on the monitor. D51/2 NS infusing at 125 ml/hr. Pain managed with prn pain medication. Pt has not had any NVD since admit. Pt has had an uneventful night and is resting quietly, will continue to monitor.

## 2018-06-08 NOTE — PROGRESS NOTES
[facility] -   General Surgery  Progress Note    Subjective:     History of Present Illness:  Patient reports he developed nausea vomiting and diarrhea all started about Monday.  This was associated with a bad taste in his mouth.  He developed abdominal distention and some cramping pain. He presented to the emergency room where CT scan showed some dilated loops of small bowel as well as thickening of the colon.  He had an elevated white count and a mild increase in his lactic acid.    Patient was treated with IV fluids and antibiotics.    He states he is feeling better this morning    Post-Op Info:  * No surgery found *         Interval History: less,but not resolved abdominal pain    Medications:  Continuous Infusions:   dextrose 5 % and 0.45 % NaCl 150 mL/hr at 06/07/18 1958     Scheduled Meds:   azithromycin  500 mg Intravenous Q24H    ceFEPime (MAXIPIME) IVPB  2 g Intravenous Q12H    ciprofloxacin (CIPRO)400mg/200ml D5W IVPB  400 mg Intravenous Q8H    metronidazole  500 mg Intravenous Q8H     PRN Meds:morphine, ondansetron     Review of patient's allergies indicates:  No Known Allergies  Objective:     Vital Signs (Most Recent):  Temp: 96.2 °F (35.7 °C) (06/08/18 0723)  Pulse: 79 (06/08/18 0723)  Resp: 20 (06/08/18 0723)  BP: (!) 149/69 (06/08/18 0723)  SpO2: 96 % (06/08/18 0750) Vital Signs (24h Range):  Temp:  [96.2 °F (35.7 °C)-99.6 °F (37.6 °C)] 96.2 °F (35.7 °C)  Pulse:  [79-99] 79  Resp:  [18-27] 20  SpO2:  [91 %-97 %] 96 %  BP: (118-161)/(65-79) 149/69     Weight: 101.5 kg (223 lb 12.3 oz)  Body mass index is 30.35 kg/m².    Intake/Output - Last 3 Shifts       06/06 0700 - 06/07 0659 06/07 0700 - 06/08 0659 06/08 0700 - 06/09 0659    I.V. (mL/kg)  4535 (44.7)     IV Piggyback 2971 1150     Total Intake(mL/kg) 2971 (32.8) 5685 (56)     Urine (mL/kg/hr)  3150 (1.3)     Total Output   3150      Net +2971 +2535                   Physical Exam   Constitutional: He is oriented to person,  place, and time.   Chronically ill  Dyspnea on exertion   Eyes: No scleral icterus.   Neck: Normal range of motion.   Cardiovascular: Normal rate, regular rhythm and normal heart sounds.    Pulmonary/Chest: Effort normal and breath sounds normal. No respiratory distress. He has no wheezes.   Poor air flow   Abdominal: Soft. Bowel sounds are normal. Distention: mild. There is tenderness (mild generalized).   Musculoskeletal: He exhibits no edema.   Neurological: He is alert and oriented to person, place, and time.   Skin: Skin is warm and dry. Capillary refill takes less than 2 seconds.   Psychiatric: He has a normal mood and affect. His behavior is normal. Judgment and thought content normal.   Vitals reviewed.      Significant Labs:  CBC:   Recent Labs  Lab 06/08/18  0600   WBC 13.08*   RBC 4.49*   HGB 12.8*   HCT 39.6*      MCV 88   MCH 28.5   MCHC 32.3     BMP:   Recent Labs  Lab 06/08/18  0600   *  115*   *  133*   K 4.1  4.1     105   CO2 20*  20*   BUN 14  14   CREATININE 1.1  1.1   CALCIUM 7.8*  7.8*   MG 2.3     CMP:   Recent Labs  Lab 06/08/18  0600   *  115*   CALCIUM 7.8*  7.8*   ALBUMIN 2.9*   PROT 6.3   *  133*   K 4.1  4.1   CO2 20*  20*     105   BUN 14  14   CREATININE 1.1  1.1   ALKPHOS 86   ALT 31   AST 25   BILITOT 0.8     Microbiology Results (last 7 days)     Procedure Component Value Units Date/Time    Blood culture [[id number]] Collected:  06/06/18 2046    Order Status:  Completed Specimen:  Blood from Peripheral, Forearm, Right Updated:  06/08/18 0812     Blood Culture, Routine No Growth to date     Blood Culture, Routine No Growth to date    Blood culture [[id number]] Collected:  06/06/18 2049    Order Status:  Completed Specimen:  Blood from Peripheral, Hand, Left Updated:  06/08/18 0812     Blood Culture, Routine No Growth to date     Blood Culture, Routine No Growth to date    Stool culture **CANNOT BE ORDERED STAT** [[id number]]  Collected:  06/07/18 1801    Order Status:  Sent Specimen:  Stool from Stool Updated:  06/08/18 0530    E. coli 0157 antigen [[id number]] Collected:  06/07/18 1801    Order Status:  No result Specimen:  Stool from Stool Updated:  06/08/18 0530    Clostridium difficile EIA [[id number]] Collected:  06/07/18 1801    Order Status:  Canceled Specimen:  Stool from Stool Updated:  06/07/18 1822          Significant Diagnostics:        EXAMINATION:  XR ABDOMEN FLAT AND ERECT    CLINICAL HISTORY:  Follow-up on small bowel distention from CT scan;    COMPARISON:  KUB performed on 06/07/2018.    FINDINGS:  There are several dilated loops of small bowel in the expected location of the jejunum.  One of the larger ones is in the left lower quadrant of the abdomen as a diameter of 3.6 cm.  On the prior examination it has a diameter of 4.2 cm.  There is no pneumoperitoneum.  There is posterior spinal fusion of where across the thoracolumbar portion of the spine.  There are several sternotomy wires visualized.  There is surgical hardware projected over the left iliac bone.  There are surgical clips projected over the left upper quadrant of the abdomen.   Impression       1. There are several dilated loops of small bowel in the expected location of the jejunum. One of the larger ones is in the left lower quadrant of the abdomen as a diameter of 3.6 cm. On the prior examination it has a diameter of 4.2 cm.  This represents an interval improvement in the appearance of the gastrointestinal system.  2. Surgical changes      Electronically signed by: Rick Leigh MD  Date: 06/08/2018  Time: 08:52         Assessment/Plan:     * Severe sepsis    Likely sent and there E to enteritis with volume depletion, resolved          Calcification of gallbladder    monitoor        Acute renal failure    Improving, monitor        COPD (chronic obstructive pulmonary disease)    Per medicine        CAD (coronary artery disease)    Per medicine         Colitis    Improving, bowel gas pattern is normalizing  Am x rays  May need sbft            Ovidio Mariee MD  General Surgery  [facility]

## 2018-06-08 NOTE — ED NOTES
Patient identifies self as Vinicius Sung    Pt. Resting in bed. No acute distress, RR equal and non labored, VSS. Bed in low, locked, and call light in reach. Side rails up X 2.     LOC: The patient is awake, alert and aware of environment with an appropriate affect, the patient is oriented x 3 and speaking appropriately.  APPEARANCE: Patient resting comfortably and in no acute distress, patient is clean and well groomed, patient's clothing is properly fastened.  SKIN: The skin is warm and dry, color consistent with ethnicity, patient has normal skin turgor and moist mucus membranes, skin intact, no breakdown or bruising noted.  MUSCULOSKELETAL: Patient moving all extremities well, no obvious swelling or deformities noted.  RESPIRATORY: Airway is open and patent, respirations are spontaneous, patient has a normal effort and rate, no accessory muscle use noted. Patient on 4 liters nasal cannula.   CARDIAC: Patient has a normal rate and rhythm, no periphreal edema noted, capillary refill < 3 seconds. Patient on cardiac monitor.   ABDOMEN: Soft and non tender to palpation, no distention noted, patient c/o of diarrhea and feels like he has to use the bathroom all the time.   NEUROLOGIC: PERRL, eyes open spontaneously, behavior appropriate to situation, follows commands, facial expression symmetrical, bilateral hand grasp equal and even, purposeful motor response noted, normal sensation in all extremities when touched with a finger.    Patient has no complaints at this time. Will continue to monitor.

## 2018-06-09 LAB
ALBUMIN SERPL BCP-MCNC: 2.7 G/DL
ALP SERPL-CCNC: 75 U/L
ALT SERPL W/O P-5'-P-CCNC: 25 U/L
ANION GAP SERPL CALC-SCNC: 7 MMOL/L
AST SERPL-CCNC: 19 U/L
BASOPHILS # BLD AUTO: 0.03 K/UL
BASOPHILS NFR BLD: 0.3 %
BILIRUB SERPL-MCNC: 0.6 MG/DL
BUN SERPL-MCNC: 9 MG/DL
CALCIUM SERPL-MCNC: 8 MG/DL
CHLORIDE SERPL-SCNC: 106 MMOL/L
CO2 SERPL-SCNC: 20 MMOL/L
CREAT SERPL-MCNC: 0.9 MG/DL
DIFFERENTIAL METHOD: ABNORMAL
EOSINOPHIL # BLD AUTO: 0.2 K/UL
EOSINOPHIL NFR BLD: 1.8 %
ERYTHROCYTE [DISTWIDTH] IN BLOOD BY AUTOMATED COUNT: 15.6 %
EST. GFR  (AFRICAN AMERICAN): >60 ML/MIN/1.73 M^2
EST. GFR  (NON AFRICAN AMERICAN): >60 ML/MIN/1.73 M^2
GLUCOSE SERPL-MCNC: 116 MG/DL
HCT VFR BLD AUTO: 39 %
HGB BLD-MCNC: 12.9 G/DL
LYMPHOCYTES # BLD AUTO: 0.7 K/UL
LYMPHOCYTES NFR BLD: 6.5 %
MCH RBC QN AUTO: 28.6 PG
MCHC RBC AUTO-ENTMCNC: 33.1 G/DL
MCV RBC AUTO: 87 FL
MONOCYTES # BLD AUTO: 1 K/UL
MONOCYTES NFR BLD: 8.5 %
NEUTROPHILS # BLD AUTO: 9.4 K/UL
NEUTROPHILS NFR BLD: 82.9 %
PLATELET # BLD AUTO: 169 K/UL
PMV BLD AUTO: 10.8 FL
POTASSIUM SERPL-SCNC: 3.6 MMOL/L
PROT SERPL-MCNC: 6.1 G/DL
RBC # BLD AUTO: 4.51 M/UL
SODIUM SERPL-SCNC: 133 MMOL/L
WBC # BLD AUTO: 11.3 K/UL

## 2018-06-09 PROCEDURE — 25000003 PHARM REV CODE 250: Performed by: NURSE PRACTITIONER

## 2018-06-09 PROCEDURE — 63600175 PHARM REV CODE 636 W HCPCS: Performed by: INTERNAL MEDICINE

## 2018-06-09 PROCEDURE — 27000221 HC OXYGEN, UP TO 24 HOURS

## 2018-06-09 PROCEDURE — 36415 COLL VENOUS BLD VENIPUNCTURE: CPT

## 2018-06-09 PROCEDURE — 85025 COMPLETE CBC W/AUTO DIFF WBC: CPT

## 2018-06-09 PROCEDURE — S0030 INJECTION, METRONIDAZOLE: HCPCS | Performed by: NURSE PRACTITIONER

## 2018-06-09 PROCEDURE — 25000003 PHARM REV CODE 250: Performed by: INTERNAL MEDICINE

## 2018-06-09 PROCEDURE — 80053 COMPREHEN METABOLIC PANEL: CPT

## 2018-06-09 PROCEDURE — 63600175 PHARM REV CODE 636 W HCPCS: Performed by: EMERGENCY MEDICINE

## 2018-06-09 PROCEDURE — 21400001 HC TELEMETRY ROOM

## 2018-06-09 RX ORDER — ROSUVASTATIN CALCIUM 10 MG/1
20 TABLET, COATED ORAL DAILY
Status: DISCONTINUED | OUTPATIENT
Start: 2018-06-10 | End: 2018-06-11 | Stop reason: HOSPADM

## 2018-06-09 RX ORDER — BACLOFEN 10 MG/1
10 TABLET ORAL 2 TIMES DAILY PRN
Status: DISCONTINUED | OUTPATIENT
Start: 2018-06-09 | End: 2018-06-11 | Stop reason: HOSPADM

## 2018-06-09 RX ORDER — QUETIAPINE FUMARATE 25 MG/1
25 TABLET, FILM COATED ORAL NIGHTLY
Status: DISCONTINUED | OUTPATIENT
Start: 2018-06-09 | End: 2018-06-11 | Stop reason: HOSPADM

## 2018-06-09 RX ORDER — ASPIRIN 81 MG/1
81 TABLET ORAL DAILY
Status: DISCONTINUED | OUTPATIENT
Start: 2018-06-10 | End: 2018-06-11 | Stop reason: HOSPADM

## 2018-06-09 RX ORDER — CLOPIDOGREL BISULFATE 75 MG/1
75 TABLET ORAL DAILY
Status: DISCONTINUED | OUTPATIENT
Start: 2018-06-10 | End: 2018-06-11 | Stop reason: HOSPADM

## 2018-06-09 RX ORDER — TRAZODONE HYDROCHLORIDE 100 MG/1
100 TABLET ORAL NIGHTLY
Status: DISCONTINUED | OUTPATIENT
Start: 2018-06-09 | End: 2018-06-11 | Stop reason: HOSPADM

## 2018-06-09 RX ORDER — DIPHENHYDRAMINE HYDROCHLORIDE 50 MG/ML
12.5 INJECTION INTRAMUSCULAR; INTRAVENOUS EVERY 6 HOURS PRN
Status: DISCONTINUED | OUTPATIENT
Start: 2018-06-10 | End: 2018-06-10

## 2018-06-09 RX ORDER — BUPROPION HYDROCHLORIDE 150 MG/1
150 TABLET, EXTENDED RELEASE ORAL 2 TIMES DAILY
Status: DISCONTINUED | OUTPATIENT
Start: 2018-06-09 | End: 2018-06-11 | Stop reason: HOSPADM

## 2018-06-09 RX ORDER — OXYCODONE AND ACETAMINOPHEN 10; 325 MG/1; MG/1
1 TABLET ORAL EVERY 8 HOURS PRN
Status: DISCONTINUED | OUTPATIENT
Start: 2018-06-09 | End: 2018-06-10

## 2018-06-09 RX ORDER — SODIUM CHLORIDE 9 MG/ML
INJECTION, SOLUTION INTRAVENOUS CONTINUOUS
Status: DISCONTINUED | OUTPATIENT
Start: 2018-06-09 | End: 2018-06-10

## 2018-06-09 RX ADMIN — CEFEPIME HYDROCHLORIDE 2 G: 2 INJECTION, SOLUTION INTRAVENOUS at 06:06

## 2018-06-09 RX ADMIN — DIPHENHYDRAMINE HYDROCHLORIDE 12.5 MG: 50 INJECTION, SOLUTION INTRAMUSCULAR; INTRAVENOUS at 11:06

## 2018-06-09 RX ADMIN — BUPROPION HYDROCHLORIDE 150 MG: 150 TABLET, FILM COATED, EXTENDED RELEASE ORAL at 08:06

## 2018-06-09 RX ADMIN — TRAZODONE HYDROCHLORIDE 100 MG: 100 TABLET ORAL at 08:06

## 2018-06-09 RX ADMIN — OXYCODONE HYDROCHLORIDE AND ACETAMINOPHEN 1 TABLET: 10; 325 TABLET ORAL at 08:06

## 2018-06-09 RX ADMIN — CEFEPIME HYDROCHLORIDE 2 G: 2 INJECTION, SOLUTION INTRAVENOUS at 04:06

## 2018-06-09 RX ADMIN — METRONIDAZOLE 500 MG: 500 INJECTION, SOLUTION INTRAVENOUS at 08:06

## 2018-06-09 RX ADMIN — METRONIDAZOLE 500 MG: 500 INJECTION, SOLUTION INTRAVENOUS at 03:06

## 2018-06-09 RX ADMIN — CIPROFLOXACIN 400 MG: 2 INJECTION, SOLUTION INTRAVENOUS at 01:06

## 2018-06-09 RX ADMIN — CIPROFLOXACIN 400 MG: 2 INJECTION, SOLUTION INTRAVENOUS at 03:06

## 2018-06-09 RX ADMIN — AZITHROMYCIN MONOHYDRATE 500 MG: 500 INJECTION, POWDER, LYOPHILIZED, FOR SOLUTION INTRAVENOUS at 08:06

## 2018-06-09 RX ADMIN — QUETIAPINE FUMARATE 25 MG: 25 TABLET ORAL at 08:06

## 2018-06-09 RX ADMIN — METRONIDAZOLE 500 MG: 500 INJECTION, SOLUTION INTRAVENOUS at 10:06

## 2018-06-09 RX ADMIN — SODIUM CHLORIDE: 0.9 INJECTION, SOLUTION INTRAVENOUS at 04:06

## 2018-06-09 NOTE — PROGRESS NOTES
[facility] Hospital Medicine  Progress Note    Patient Name: Vinicius Sung  MRN: [id number]  Patient Class: IP- Inpatient   Admission Date: 6/6/2018  Length of Stay: 2 days  Attending Physician: Domenica Hill MD  Primary Care Provider: Vj Gonzalez MD        Subjective:     Principal Problem:Severe sepsis    HPI:  Vinicius Sung is a 67 year old male with history of COPD, CAD, and PAD who presented from [facility] Emergency Department for abdominal pain that began 3 days ago. Associated symptoms include diarrhea, decreased appetite, vomiting, and metallic taste. He denies fever, chills, and chest pain. CT Chest showed evidence of colitis involving rectum and sigmoid and possible partial small bowel obstruction. He was subsequently transferred for further work up. Patient reports abdominal pain has significantly improved. No further episodes of diarrhea and vomiting. He remains NPO.       Hospital Course:  6/8  Patient with improved symptoms. COntinues to experience abd discomfort. Patient with bowel sounds and evaluated by surgery who do not recommend surgery at present. Patient with bowel rest, npo. Patient no BM or flatus at present. Patient is belching. Patient denies CP / Vomiting / Nausea  6/9  Advance diet as per surgery recommendation. Follow outpatient for calcification of gall bladder . Patient had only episode of loose stool in ed on presentation . Patient requesting for iv pain meds . Will resume his home pain meds         Review of Systems   Constitutional: Positive for fatigue. Negative for activity change, appetite change and chills.   HENT: Negative.  Negative for congestion, drooling, hearing loss and postnasal drip.         Bad taste in his mouth   Eyes: Negative.    Respiratory: Negative for cough, chest tightness and shortness of breath.    Cardiovascular: Negative.  Negative for chest pain and leg swelling.   Gastrointestinal: Positive for abdominal distention,  abdominal pain, diarrhea, nausea and vomiting. Negative for anal bleeding and blood in stool.   Endocrine: Negative.  Negative for cold intolerance, polydipsia and polyuria.   Genitourinary: Negative.  Negative for discharge, dysuria, frequency and penile pain.   Musculoskeletal: Negative.  Negative for back pain, gait problem and joint swelling.   Skin: Negative.  Negative for color change and rash.   Allergic/Immunologic: Negative.    Neurological: Negative.    Hematological: Negative.    Psychiatric/Behavioral: Negative.      Objective:     Vital Signs (Most Recent):  Temp: 98.2 °F (36.8 °C) (06/09/18 1556)  Pulse: 83 (06/09/18 1556)  Resp: 18 (06/09/18 1556)  BP: (!) 143/75 (06/09/18 1556)  SpO2: (!) 94 % (06/09/18 1556) Vital Signs (24h Range):  Temp:  [97.9 °F (36.6 °C)-98.9 °F (37.2 °C)] 98.2 °F (36.8 °C)  Pulse:  [67-83] 83  Resp:  [18-20] 18  SpO2:  [94 %-97 %] 94 %  BP: (130-160)/(64-75) 143/75     Weight: 99 kg (218 lb 4.1 oz)  Body mass index is 29.6 kg/m².    Intake/Output Summary (Last 24 hours) at 06/09/18 1630  Last data filed at 06/09/18 0528   Gross per 24 hour   Intake             4770 ml   Output             2250 ml   Net             2520 ml      Physical Exam   Constitutional: He is oriented to person, place, and time. He appears well-developed and well-nourished.   HENT:   Head: Normocephalic and atraumatic.   Eyes: EOM are normal. Pupils are equal, round, and reactive to light.   Neck: Normal range of motion. Neck supple.   Cardiovascular: Normal rate, regular rhythm and intact distal pulses.    Murmur heard.  Pulmonary/Chest: Effort normal. Tachypnea noted. No respiratory distress. He has decreased breath sounds in the left upper field. He has wheezes.   Abdominal: Soft. Bowel sounds are normal. He exhibits distension. He exhibits no mass. There is tenderness. There is guarding. No hernia.   Musculoskeletal: Normal range of motion. He exhibits no deformity.   Neurological: He is alert and  oriented to person, place, and time. He has normal reflexes.   Skin: Skin is warm and dry. Capillary refill takes less than 2 seconds.   Psychiatric: He has a normal mood and affect. His behavior is normal. Judgment and thought content normal.   Nursing note and vitals reviewed.      Significant Labs: All pertinent labs within the past 24 hours have been reviewed.    Significant Imaging: I have reviewed all pertinent imaging results/findings within the past 24 hours.    Assessment/Plan:      * Severe sepsis    --Leukocytosis, initial lactic acid>2, tachypnea, tachycardia  --CT findings concerning for colitis  --IV abx    6/8  Resolving  Lactivc Acid 1.5  WBC 13.08 vs 24.8  ABX  Fluids        Calcification of gallbladder    Per surgery          COPD (chronic obstructive pulmonary disease)    -Duonebs    6/8  Stable  Nebs  O2            CAD (coronary artery disease)    -hold ASA, Plavix, and STATIN        Colitis    --IV Flagyl and Ciprofloxacin  --Stool studies pending  --reactive ileus likely related colitis   --Bowel rest for now  --KUB today    6/8  Improving  Bowel rest  ABX          VTE Risk Mitigation         Ordered     IP VTE LOW RISK PATIENT  Once      06/07/18 1716     Place EARNESTINE hose  Until discontinued      06/07/18 1716     Place sequential compression device  Until discontinued      06/07/18 1716              Domenica Hill MD  Department of Hospital Medicine   [facility]

## 2018-06-09 NOTE — PLAN OF CARE
Problem: Patient Care Overview  Goal: Individualization & Mutuality  Outcome: Ongoing (interventions implemented as appropriate)  Patient awake and alert, in NAD. Patient had uneventful shift. VS stable. Patient denies pain or SOB. Patient on telemetry, NSR. Patient is currently on clear liquid diet, tolerating. Patient ambulates with assistance. Fall precautions in place. Bed locked and in lowest position. Yellow fall risk band and non-skid socks on patient. Patient free from fall/injury. Plan of care reviewed with patient. All questions answered. Will continue to monitor.

## 2018-06-09 NOTE — SUBJECTIVE & OBJECTIVE
Review of Systems   Constitutional: Positive for fatigue. Negative for activity change, appetite change and chills.   HENT: Negative.  Negative for congestion, drooling, hearing loss and postnasal drip.         Bad taste in his mouth   Eyes: Negative.    Respiratory: Negative for cough, chest tightness and shortness of breath.    Cardiovascular: Negative.  Negative for chest pain and leg swelling.   Gastrointestinal: Positive for abdominal distention, abdominal pain, diarrhea, nausea and vomiting. Negative for anal bleeding and blood in stool.   Endocrine: Negative.  Negative for cold intolerance, polydipsia and polyuria.   Genitourinary: Negative.  Negative for discharge, dysuria, frequency and penile pain.   Musculoskeletal: Negative.  Negative for back pain, gait problem and joint swelling.   Skin: Negative.  Negative for color change and rash.   Allergic/Immunologic: Negative.    Neurological: Negative.    Hematological: Negative.    Psychiatric/Behavioral: Negative.      Objective:     Vital Signs (Most Recent):  Temp: 98.2 °F (36.8 °C) (06/09/18 1556)  Pulse: 83 (06/09/18 1556)  Resp: 18 (06/09/18 1556)  BP: (!) 143/75 (06/09/18 1556)  SpO2: (!) 94 % (06/09/18 1556) Vital Signs (24h Range):  Temp:  [97.9 °F (36.6 °C)-98.9 °F (37.2 °C)] 98.2 °F (36.8 °C)  Pulse:  [67-83] 83  Resp:  [18-20] 18  SpO2:  [94 %-97 %] 94 %  BP: (130-160)/(64-75) 143/75     Weight: 99 kg (218 lb 4.1 oz)  Body mass index is 29.6 kg/m².    Intake/Output Summary (Last 24 hours) at 06/09/18 1630  Last data filed at 06/09/18 0528   Gross per 24 hour   Intake             4770 ml   Output             2250 ml   Net             2520 ml      Physical Exam   Constitutional: He is oriented to person, place, and time. He appears well-developed and well-nourished.   HENT:   Head: Normocephalic and atraumatic.   Eyes: EOM are normal. Pupils are equal, round, and reactive to light.   Neck: Normal range of motion. Neck supple.   Cardiovascular:  Normal rate, regular rhythm and intact distal pulses.    Murmur heard.  Pulmonary/Chest: Effort normal. Tachypnea noted. No respiratory distress. He has decreased breath sounds in the left upper field. He has wheezes.   Abdominal: Soft. Bowel sounds are normal. He exhibits distension. He exhibits no mass. There is tenderness. There is guarding. No hernia.   Musculoskeletal: Normal range of motion. He exhibits no deformity.   Neurological: He is alert and oriented to person, place, and time. He has normal reflexes.   Skin: Skin is warm and dry. Capillary refill takes less than 2 seconds.   Psychiatric: He has a normal mood and affect. His behavior is normal. Judgment and thought content normal.   Nursing note and vitals reviewed.      Significant Labs: All pertinent labs within the past 24 hours have been reviewed.    Significant Imaging: I have reviewed all pertinent imaging results/findings within the past 24 hours.

## 2018-06-10 PROBLEM — R65.20 SEVERE SEPSIS: Status: RESOLVED | Noted: 2018-06-06 | Resolved: 2018-06-10

## 2018-06-10 PROBLEM — A41.9 SEVERE SEPSIS: Status: RESOLVED | Noted: 2018-06-06 | Resolved: 2018-06-10

## 2018-06-10 LAB
ALBUMIN SERPL BCP-MCNC: 2.8 G/DL
ALP SERPL-CCNC: 73 U/L
ALT SERPL W/O P-5'-P-CCNC: 22 U/L
ANION GAP SERPL CALC-SCNC: 10 MMOL/L
AST SERPL-CCNC: 16 U/L
BASOPHILS # BLD AUTO: 0.11 K/UL
BASOPHILS NFR BLD: 1.1 %
BILIRUB SERPL-MCNC: 0.5 MG/DL
BUN SERPL-MCNC: 11 MG/DL
CALCIUM SERPL-MCNC: 8.4 MG/DL
CHLORIDE SERPL-SCNC: 106 MMOL/L
CO2 SERPL-SCNC: 21 MMOL/L
CREAT SERPL-MCNC: 1.1 MG/DL
DIFFERENTIAL METHOD: ABNORMAL
EOSINOPHIL # BLD AUTO: 0.4 K/UL
EOSINOPHIL NFR BLD: 4.4 %
ERYTHROCYTE [DISTWIDTH] IN BLOOD BY AUTOMATED COUNT: 15.6 %
EST. GFR  (AFRICAN AMERICAN): >60 ML/MIN/1.73 M^2
EST. GFR  (NON AFRICAN AMERICAN): >60 ML/MIN/1.73 M^2
GLUCOSE SERPL-MCNC: 91 MG/DL
HCT VFR BLD AUTO: 41.5 %
HGB BLD-MCNC: 13.8 G/DL
LYMPHOCYTES # BLD AUTO: 1.1 K/UL
LYMPHOCYTES NFR BLD: 10.9 %
MCH RBC QN AUTO: 28.7 PG
MCHC RBC AUTO-ENTMCNC: 33.3 G/DL
MCV RBC AUTO: 86 FL
MONOCYTES # BLD AUTO: 1.1 K/UL
MONOCYTES NFR BLD: 11 %
NEUTROPHILS # BLD AUTO: 7.1 K/UL
NEUTROPHILS NFR BLD: 72.6 %
PLATELET # BLD AUTO: 207 K/UL
PMV BLD AUTO: 11 FL
POTASSIUM SERPL-SCNC: 3.4 MMOL/L
PROT SERPL-MCNC: 6.4 G/DL
RBC # BLD AUTO: 4.81 M/UL
SODIUM SERPL-SCNC: 137 MMOL/L
WBC # BLD AUTO: 9.81 K/UL

## 2018-06-10 PROCEDURE — 27000221 HC OXYGEN, UP TO 24 HOURS

## 2018-06-10 PROCEDURE — 63600175 PHARM REV CODE 636 W HCPCS: Performed by: NURSE PRACTITIONER

## 2018-06-10 PROCEDURE — S0030 INJECTION, METRONIDAZOLE: HCPCS | Performed by: NURSE PRACTITIONER

## 2018-06-10 PROCEDURE — 25000003 PHARM REV CODE 250: Performed by: INTERNAL MEDICINE

## 2018-06-10 PROCEDURE — 85025 COMPLETE CBC W/AUTO DIFF WBC: CPT

## 2018-06-10 PROCEDURE — 21400001 HC TELEMETRY ROOM

## 2018-06-10 PROCEDURE — 25000003 PHARM REV CODE 250: Performed by: NURSE PRACTITIONER

## 2018-06-10 PROCEDURE — 80053 COMPREHEN METABOLIC PANEL: CPT

## 2018-06-10 PROCEDURE — 63600175 PHARM REV CODE 636 W HCPCS: Performed by: INTERNAL MEDICINE

## 2018-06-10 PROCEDURE — 94761 N-INVAS EAR/PLS OXIMETRY MLT: CPT

## 2018-06-10 PROCEDURE — 36415 COLL VENOUS BLD VENIPUNCTURE: CPT

## 2018-06-10 RX ORDER — METRONIDAZOLE 500 MG/1
500 TABLET ORAL EVERY 8 HOURS
Qty: 15 TABLET | Refills: 0 | Status: SHIPPED | OUTPATIENT
Start: 2018-06-10 | End: 2018-06-15

## 2018-06-10 RX ORDER — ACETAMINOPHEN 325 MG/1
650 TABLET ORAL EVERY 6 HOURS PRN
Status: DISCONTINUED | OUTPATIENT
Start: 2018-06-10 | End: 2018-06-11 | Stop reason: HOSPADM

## 2018-06-10 RX ORDER — METRONIDAZOLE 500 MG/1
500 TABLET ORAL EVERY 8 HOURS
Status: DISCONTINUED | OUTPATIENT
Start: 2018-06-10 | End: 2018-06-11 | Stop reason: HOSPADM

## 2018-06-10 RX ORDER — CIPROFLOXACIN 500 MG/1
500 TABLET ORAL EVERY 12 HOURS
Status: DISCONTINUED | OUTPATIENT
Start: 2018-06-10 | End: 2018-06-11 | Stop reason: HOSPADM

## 2018-06-10 RX ORDER — CIPROFLOXACIN 500 MG/1
500 TABLET ORAL EVERY 12 HOURS
Qty: 10 TABLET | Refills: 0 | Status: SHIPPED | OUTPATIENT
Start: 2018-06-10 | End: 2018-06-15

## 2018-06-10 RX ADMIN — BUPROPION HYDROCHLORIDE 150 MG: 150 TABLET, FILM COATED, EXTENDED RELEASE ORAL at 08:06

## 2018-06-10 RX ADMIN — ONDANSETRON 4 MG: 2 INJECTION INTRAMUSCULAR; INTRAVENOUS at 09:06

## 2018-06-10 RX ADMIN — CEFEPIME HYDROCHLORIDE 2 G: 2 INJECTION, SOLUTION INTRAVENOUS at 05:06

## 2018-06-10 RX ADMIN — CIPROFLOXACIN 400 MG: 2 INJECTION, SOLUTION INTRAVENOUS at 03:06

## 2018-06-10 RX ADMIN — METRONIDAZOLE 500 MG: 500 TABLET ORAL at 02:06

## 2018-06-10 RX ADMIN — CIPROFLOXACIN 500 MG: 500 TABLET, FILM COATED ORAL at 02:06

## 2018-06-10 RX ADMIN — BUPROPION HYDROCHLORIDE 150 MG: 150 TABLET, FILM COATED, EXTENDED RELEASE ORAL at 09:06

## 2018-06-10 RX ADMIN — METRONIDAZOLE 500 MG: 500 TABLET ORAL at 09:06

## 2018-06-10 RX ADMIN — ROSUVASTATIN CALCIUM 20 MG: 10 TABLET, FILM COATED ORAL at 08:06

## 2018-06-10 RX ADMIN — ONDANSETRON 4 MG: 2 INJECTION INTRAMUSCULAR; INTRAVENOUS at 12:06

## 2018-06-10 RX ADMIN — TRAZODONE HYDROCHLORIDE 100 MG: 100 TABLET ORAL at 09:06

## 2018-06-10 RX ADMIN — ASPIRIN 81 MG: 81 TABLET, COATED ORAL at 08:06

## 2018-06-10 RX ADMIN — METRONIDAZOLE 500 MG: 500 INJECTION, SOLUTION INTRAVENOUS at 07:06

## 2018-06-10 RX ADMIN — QUETIAPINE FUMARATE 25 MG: 25 TABLET ORAL at 09:06

## 2018-06-10 RX ADMIN — BACLOFEN 10 MG: 10 TABLET ORAL at 09:06

## 2018-06-10 RX ADMIN — CLOPIDOGREL BISULFATE 75 MG: 75 TABLET ORAL at 08:06

## 2018-06-10 NOTE — PROGRESS NOTES
TONE f/u with patient to advise of discharge.  Per Dr. Soto, patient is medically stable for discharge.  Patient advised SW that he would appeal his discharge.  Patient called [employer] at  number on IMM and left a detailed voice message advising of his intent to appeal.  SW advised Tele Charge Nurse that patient is appealing his discharge.  Sherlyn Muir LMSW, [facility], [facility]  6/10/2018

## 2018-06-10 NOTE — DISCHARGE SUMMARY
[facility] Hospital Medicine  Discharge Summary      Patient Name: Vinicius Sung  MRN: [id number]  Admission Date: 6/6/2018  Hospital Length of Stay: 3 days  Discharge Date and Time:  06/10/2018 12:58 PM  Attending Physician: Domenica Hill MD   Discharging Provider: Domenica Hill MD  Primary Care Provider: Vj Gonzalez MD      HPI:   Vinicius Sung is a 67 year old male with history of COPD, CAD, and PAD who presented from [facility] Emergency Department for abdominal pain that began 3 days ago. Associated symptoms include diarrhea, decreased appetite, vomiting, and metallic taste. He denies fever, chills, and chest pain. CT Chest showed evidence of colitis involving rectum and sigmoid and possible partial small bowel obstruction. He was subsequently transferred for further work up. Patient reports abdominal pain has significantly improved. No further episodes of diarrhea and vomiting. He remains NPO.       * No surgery found *      Hospital Course:   6/8  Patient with improved symptoms. COntinues to experience abd discomfort. Patient with bowel sounds and evaluated by surgery who do not recommend surgery at present. Patient with bowel rest, npo. Patient no BM or flatus at present. Patient is belching. Patient denies CP / Vomiting / Nausea  6/9  Advance diet as per surgery recommendation. Follow outpatient for calcification of gall bladder . Patient had only episode of loose stool in ed on presentation . Patient requesting for iv pain meds . Will resume his home pain meds   6/10  Patient was able to tolerate diet . Dr Pearson Surgery ok to discharge patient home and follow up outpatient . Will complete ten total days of antibotics for colitis . Patient was seen and examined stABLE TO DISCHARGE HOME .      Consults:   Consults         Status Ordering Provider     Inpatient consult to General Surgery  Once     Provider:  Ovidio Mariee MD    Completed ALEN DAS           No new Assessment & Plan notes have been filed under this hospital service since the last note was generated.  Service: Hospital Medicine    Final Active Diagnoses:    Diagnosis Date Noted POA    Calcification of gallbladder [K82.8] 06/08/2018 Yes    Colitis [K52.9] 06/07/2018 Yes    CAD (coronary artery disease) [I25.10] 06/07/2018 Yes    COPD (chronic obstructive pulmonary disease) [J44.9] 06/07/2018 Yes      Problems Resolved During this Admission:    Diagnosis Date Noted Date Resolved POA    PRINCIPAL PROBLEM:  Severe sepsis [A41.9, R65.20] 06/06/2018 06/10/2018 Yes    Acute renal failure [N17.9] 06/07/2018 06/08/2018 Yes       Discharged Condition: stable    Disposition: Home or Self Care    Follow Up:  Follow-up Information     Vj Gonzlaez MD In 1 week.    Specialty:  Internal Medicine  Contact information:  [address] [facility] [id number] [phone]             Hussein Pearson MD In 1 week.    Specialties:  General Surgery, Bariatrics  Contact information:  [address] [facility] [address] [phone]                 Patient Instructions:   No discharge procedures on file.    Significant Diagnostic Studies: Labs:   BMP:   Recent Labs  Lab 06/09/18  0529 06/10/18  0512   * 91   * 137   K 3.6 3.4*    106   CO2 20* 21*   BUN 9 11   CREATININE 0.9 1.1   CALCIUM 8.0* 8.4*       Pending Diagnostic Studies:     None         Medications:  Reconciled Home Medications:      Medication List      START taking these medications    ciprofloxacin HCl 500 MG tablet  Commonly known as:  CIPRO  Take 1 tablet (500 mg total) by mouth every 12 (twelve) hours.     metroNIDAZOLE 500 MG tablet  Commonly known as:  FLAGYL  Take 1 tablet (500 mg total) by mouth every 8 (eight) hours.        CONTINUE taking these medications    albuterol sulfate 2.5 mg/0.5 mL Nebu  Take 2.5 mg by nebulization every 4 (four) hours as needed for Wheezing.     alendronate 35 MG tablet  Commonly  known as:  FOSAMAX  Take 1 tablet by mouth every 7 days.     aspirin 81 MG EC tablet  Commonly known as:  ECOTRIN  Take 81 mg by mouth once daily.     baclofen 10 MG tablet  Commonly known as:  LIORESAL  Take 1 tablet by mouth 3 (three) times daily.     buPROPion 150 MG TBSR 12 hr tablet  Commonly known as:  WELLBUTRIN SR  Take 150 mg by mouth 2 (two) times daily.     clopidogrel 75 mg tablet  Commonly known as:  PLAVIX  Take 1 tablet by mouth once daily.     cyproheptadine 4 mg tablet  Commonly known as:  PERIACTIN  Take 2 mg by mouth 3 (three) times daily.     meloxicam 7.5 MG tablet  Commonly known as:  MOBIC  Take 1 tablet by mouth 2 (two) times daily.     multivitamin per tablet  Commonly known as:  THERAGRAN  Take 1 tablet by mouth once daily.     omeprazole 20 MG capsule  Commonly known as:  PRILOSEC  Take 1 capsule by mouth once daily.     predniSONE 5 MG tablet  Commonly known as:  DELTASONE  Take 1 tablet by mouth once daily.     pregabalin 150 MG capsule  Commonly known as:  LYRICA  Take 300 mg by mouth 2 (two) times daily.     QUEtiapine 25 MG Tab  Commonly known as:  SEROQUEL  Take 25 mg by mouth every evening.     rOPINIRole 1 MG tablet  Commonly known as:  REQUIP  Take 1 tablet by mouth every evening.     rosuvastatin 20 MG tablet  Commonly known as:  CRESTOR  Take 1 tablet by mouth once daily.     sildenafil 100 MG tablet  Commonly known as:  VIAGRA  Take 100 mg by mouth as needed for Erectile Dysfunction.     traMADol 50 mg tablet  Commonly known as:  ULTRAM  Take 50 mg by mouth every 8 (eight) hours as needed for Pain.     traZODone 100 MG tablet  Commonly known as:  DESYREL  Take 1 tablet by mouth every evening.     venlafaxine 37.5 MG Tab  Commonly known as:  EFFEXOR  Take by mouth 2 (two) times daily. Take two tablets in the morning and one tablet in the evening.            Indwelling Lines/Drains at time of discharge:   Lines/Drains/Airways          No matching active lines, drains, or airways           Time spent on the discharge of patient: 40 minutes  Patient was seen and examined on the date of discharge and determined to be suitable for discharge.         Domenica Hill MD  Department of Hospital Medicine  [facility]

## 2018-06-10 NOTE — PLAN OF CARE
IMM explained to patient.  Patient stated that he was not certain if he would discharge.  Having difficulty with food going down.  IMM dated signed and timed.  Patient provided with a copy.       06/10/18 1236   Medicare Message   Important Message from Medicare regarding Discharge Appeal Rights Given to patient/caregiver;Explained to patient/caregiver;Signed/date by patient/caregiver   Sherlyn Muir LMSW, STEPHANY-[facility], CCM  6/10/2018

## 2018-06-10 NOTE — PROGRESS NOTES
TONE faxed packet containing demographics, last three days of progress notes, labs, imaging, medication list, H&P, and discharge summary to [facility] at [phone].  Patient still needs Detailed Notice of Discharge.  Packet placed on desk of Jayleen NDIAYE for followup on M onday.  Sherlyn Muir LMSW, [facility], [facility]  6/10/2018

## 2018-06-10 NOTE — PLAN OF CARE
Problem: Patient Care Overview  Goal: Plan of Care Review  Outcome: Ongoing (interventions implemented as appropriate)  The patient has been sinus rhythm on the monitor. Normal saline infusing at 50 ml/hr. Pt tolerating clear liquids. Pain managed with prn medication. Pt described whole body to be itching after administration of azithromycin (not first dose), administered benadryl. Pt is resting quietly, will continue to monitor.

## 2018-06-10 NOTE — PLAN OF CARE
Problem: Patient Care Overview  Goal: Plan of Care Review  Outcome: Ongoing (interventions implemented as appropriate)  Pt. Is aware of plan of care to continue on a clear liquid diet and states that he will be ready to be discharged tomorrow morning.

## 2018-06-11 VITALS
SYSTOLIC BLOOD PRESSURE: 147 MMHG | HEIGHT: 72 IN | TEMPERATURE: 98 F | HEART RATE: 101 BPM | WEIGHT: 207.69 LBS | RESPIRATION RATE: 18 BRPM | DIASTOLIC BLOOD PRESSURE: 83 MMHG | BODY MASS INDEX: 28.13 KG/M2 | OXYGEN SATURATION: 93 %

## 2018-06-11 PROBLEM — R10.31 RIGHT LOWER QUADRANT ABDOMINAL PAIN: Status: ACTIVE | Noted: 2018-06-11

## 2018-06-11 PROBLEM — Z95.1 S/P CABG (CORONARY ARTERY BYPASS GRAFT): Chronic | Status: ACTIVE | Noted: 2018-06-07

## 2018-06-11 PROBLEM — Z95.1 S/P CABG (CORONARY ARTERY BYPASS GRAFT): Status: ACTIVE | Noted: 2018-06-07

## 2018-06-11 LAB
BACTERIA STL CULT: NORMAL
E COLI SXT1 STL QL IA: NEGATIVE
E COLI SXT2 STL QL IA: NEGATIVE

## 2018-06-11 PROCEDURE — 25000003 PHARM REV CODE 250: Performed by: INTERNAL MEDICINE

## 2018-06-11 PROCEDURE — 94761 N-INVAS EAR/PLS OXIMETRY MLT: CPT

## 2018-06-11 RX ORDER — AMOXICILLIN 250 MG
1 CAPSULE ORAL ONCE
Status: COMPLETED | OUTPATIENT
Start: 2018-06-11 | End: 2018-06-11

## 2018-06-11 RX ORDER — BISACODYL 10 MG
10 SUPPOSITORY, RECTAL RECTAL ONCE
Status: DISCONTINUED | OUTPATIENT
Start: 2018-06-11 | End: 2018-06-11 | Stop reason: HOSPADM

## 2018-06-11 RX ORDER — ONDANSETRON 4 MG/1
4 TABLET, FILM COATED ORAL EVERY 8 HOURS PRN
Qty: 30 TABLET | Refills: 0 | Status: SHIPPED | OUTPATIENT
Start: 2018-06-11

## 2018-06-11 RX ORDER — AMOXICILLIN 250 MG
1 CAPSULE ORAL DAILY PRN
Status: DISCONTINUED | OUTPATIENT
Start: 2018-06-11 | End: 2018-06-11

## 2018-06-11 RX ADMIN — STANDARDIZED SENNA CONCENTRATE AND DOCUSATE SODIUM 1 TABLET: 8.6; 5 TABLET, FILM COATED ORAL at 09:06

## 2018-06-11 RX ADMIN — METRONIDAZOLE 500 MG: 500 TABLET ORAL at 05:06

## 2018-06-11 RX ADMIN — ASPIRIN 81 MG: 81 TABLET, COATED ORAL at 09:06

## 2018-06-11 RX ADMIN — CIPROFLOXACIN 500 MG: 500 TABLET, FILM COATED ORAL at 09:06

## 2018-06-11 RX ADMIN — CLOPIDOGREL BISULFATE 75 MG: 75 TABLET ORAL at 09:06

## 2018-06-11 RX ADMIN — ROSUVASTATIN CALCIUM 20 MG: 10 TABLET, FILM COATED ORAL at 09:06

## 2018-06-11 RX ADMIN — METRONIDAZOLE 500 MG: 500 TABLET ORAL at 01:06

## 2018-06-11 RX ADMIN — BUPROPION HYDROCHLORIDE 150 MG: 150 TABLET, FILM COATED, EXTENDED RELEASE ORAL at 09:06

## 2018-06-11 NOTE — PLAN OF CARE
Problem: Patient Care Overview  Goal: Plan of Care Review  Outcome: Ongoing (interventions implemented as appropriate)  The patient has been sinus rhythm on the monitor. Normal saline infusing at 50 ml/hr. Pt tolerating clear liquids. Pain managed with prn medication. Pt has had an uneventful night and is resting quietly, will continue to monitor.

## 2018-06-11 NOTE — PROGRESS NOTES
[facility] -   General Surgery  Progress Note    Subjective:     History of Present Illness:  Patient reports he developed nausea vomiting and diarrhea all started about Monday.  This was associated with a bad taste in his mouth.  He developed abdominal distention and some cramping pain. He presented to the emergency room where CT scan showed some dilated loops of small bowel as well as thickening of the colon.  He had an elevated white count and a mild increase in his lactic acid.    Patient was treated with IV fluids and antibiotics.    He states he is feeling better this morning    Post-Op Info:  * No surgery found *         Interval History:  Patient did not tolerate a regular diet.  He reports crampy abdominal pain.  The pain is mainly in the right lower quadrant    Medications:  Continuous Infusions:  Scheduled Meds:   aspirin  81 mg Oral Daily    bisacodyl  10 mg Rectal Once    buPROPion  150 mg Oral BID    ciprofloxacin HCl  500 mg Oral Q12H    clopidogrel  75 mg Oral Daily    metroNIDAZOLE  500 mg Oral Q8H    QUEtiapine  25 mg Oral QHS    rosuvastatin  20 mg Oral Daily    senna-docusate 8.6-50 mg  1 tablet Oral Once    traZODone  100 mg Oral QHS     PRN Meds:acetaminophen, baclofen, ondansetron     Review of patient's allergies indicates:  No Known Allergies  Objective:     Vital Signs (Most Recent):  Temp: 97.9 °F (36.6 °C) (06/11/18 0733)  Pulse: 102 (06/11/18 0809)  Resp: 18 (06/11/18 0809)  BP: 130/79 (06/11/18 0733)  SpO2: (!) 94 % (06/11/18 0809) Vital Signs (24h Range):  Temp:  [97.9 °F (36.6 °C)-99 °F (37.2 °C)] 97.9 °F (36.6 °C)  Pulse:  [] 102  Resp:  [16-18] 18  SpO2:  [92 %-94 %] 94 %  BP: (130-158)/(74-90) 130/79     Weight: 94.2 kg (207 lb 10.8 oz)  Body mass index is 28.17 kg/m².    Intake/Output - Last 3 Shifts       06/09 0700 - 06/10 0659 06/10 0700 - 06/11 0659 06/11 0700 - 06/12 0659    P.O. 240 960     I.V. (mL/kg) 2259.2 (24)      IV Piggyback 1050 100      Total Intake(mL/kg) 3549.2 (37.7) 1060 (11.3)     Urine (mL/kg/hr) 2000 (0.9) 2000 (0.9)     Total Output 2000 2000      Net +1549.2 -940                   Physical Exam   Constitutional: He is oriented to person, place, and time.   Chronically ill, slightly   HENT:   Head: Normocephalic.   Neck: Normal range of motion. Neck supple.   Cardiovascular: Normal rate and regular rhythm.    Pulmonary/Chest: Effort normal and breath sounds normal.   Abdominal: Bowel sounds are normal. He exhibits no distension. Tenderness: Mild right lower quadrant. There is no rebound and no guarding.   He denies any epigastric tenderness or right upper quadrant tenderness   Neurological: He is alert and oriented to person, place, and time.   Skin: Skin is warm and dry. Capillary refill takes less than 2 seconds.   Psychiatric: He has a normal mood and affect. His behavior is normal. Judgment and thought content normal.   Vitals reviewed.      Significant Labs:  CBC:   Recent Labs  Lab 06/10/18  0512   WBC 9.81   RBC 4.81   HGB 13.8*   HCT 41.5      MCV 86   MCH 28.7   MCHC 33.3     BMP:   Recent Labs  Lab 06/08/18  0600  06/10/18  0512   *  115*  < > 91   *  133*  < > 137   K 4.1  4.1  < > 3.4*     105  < > 106   CO2 20*  20*  < > 21*   BUN 14  14  < > 11   CREATININE 1.1  1.1  < > 1.1   CALCIUM 7.8*  7.8*  < > 8.4*   MG 2.3  --   --    < > = values in this interval not displayed.  CMP:   Recent Labs  Lab 06/10/18  0512   GLU 91   CALCIUM 8.4*   ALBUMIN 2.8*   PROT 6.4      K 3.4*   CO2 21*      BUN 11   CREATININE 1.1   ALKPHOS 73   ALT 22   AST 16   BILITOT 0.5       Significant Diagnostics:  Last abdominal film showed a normal small bowel/gas pattern    Assessment/Plan:     Calcification of gallbladder    This is likely a chronically scarred gallbladder with very little bile going in and out of it. I do not feel that this is the causes of abdominal pain        COPD (chronic obstructive  pulmonary disease)    Per medicine        CAD (coronary artery disease)    Per medicine        Colitis    Improving, bowel gas pattern normal.  The patient however is not really tolerating a diet.            Ovidio Mariee MD  General Surgery  [facility]

## 2018-06-11 NOTE — SUBJECTIVE & OBJECTIVE
Interval History:  Patient did not tolerate a regular diet.  He reports crampy abdominal pain.  The pain is mainly in the right lower quadrant    Medications:  Continuous Infusions:  Scheduled Meds:   aspirin  81 mg Oral Daily    bisacodyl  10 mg Rectal Once    buPROPion  150 mg Oral BID    ciprofloxacin HCl  500 mg Oral Q12H    clopidogrel  75 mg Oral Daily    metroNIDAZOLE  500 mg Oral Q8H    QUEtiapine  25 mg Oral QHS    rosuvastatin  20 mg Oral Daily    senna-docusate 8.6-50 mg  1 tablet Oral Once    traZODone  100 mg Oral QHS     PRN Meds:acetaminophen, baclofen, ondansetron     Review of patient's allergies indicates:  No Known Allergies  Objective:     Vital Signs (Most Recent):  Temp: 97.9 °F (36.6 °C) (06/11/18 0733)  Pulse: 102 (06/11/18 0809)  Resp: 18 (06/11/18 0809)  BP: 130/79 (06/11/18 0733)  SpO2: (!) 94 % (06/11/18 0809) Vital Signs (24h Range):  Temp:  [97.9 °F (36.6 °C)-99 °F (37.2 °C)] 97.9 °F (36.6 °C)  Pulse:  [] 102  Resp:  [16-18] 18  SpO2:  [92 %-94 %] 94 %  BP: (130-158)/(74-90) 130/79     Weight: 94.2 kg (207 lb 10.8 oz)  Body mass index is 28.17 kg/m².    Intake/Output - Last 3 Shifts       06/09 0700 - 06/10 0659 06/10 0700 - 06/11 0659 06/11 0700 - 06/12 0659    P.O. 240 960     I.V. (mL/kg) 2259.2 (24)      IV Piggyback 1050 100     Total Intake(mL/kg) 3549.2 (37.7) 1060 (11.3)     Urine (mL/kg/hr) 2000 (0.9) 2000 (0.9)     Total Output 2000 2000      Net +1549.2 -940                   Physical Exam   Constitutional: He is oriented to person, place, and time.   Chronically ill, slightly   HENT:   Head: Normocephalic.   Neck: Normal range of motion. Neck supple.   Cardiovascular: Normal rate and regular rhythm.    Pulmonary/Chest: Effort normal and breath sounds normal.   Abdominal: Bowel sounds are normal. He exhibits no distension. Tenderness: Mild right lower quadrant. There is no rebound and no guarding.   He denies any epigastric tenderness or right upper quadrant  tenderness   Neurological: He is alert and oriented to person, place, and time.   Skin: Skin is warm and dry. Capillary refill takes less than 2 seconds.   Psychiatric: He has a normal mood and affect. His behavior is normal. Judgment and thought content normal.   Vitals reviewed.      Significant Labs:  CBC:   Recent Labs  Lab 06/10/18  0512   WBC 9.81   RBC 4.81   HGB 13.8*   HCT 41.5      MCV 86   MCH 28.7   MCHC 33.3     BMP:   Recent Labs  Lab 06/08/18  0600  06/10/18  0512   *  115*  < > 91   *  133*  < > 137   K 4.1  4.1  < > 3.4*     105  < > 106   CO2 20*  20*  < > 21*   BUN 14  14  < > 11   CREATININE 1.1  1.1  < > 1.1   CALCIUM 7.8*  7.8*  < > 8.4*   MG 2.3  --   --    < > = values in this interval not displayed.  CMP:   Recent Labs  Lab 06/10/18  0512   GLU 91   CALCIUM 8.4*   ALBUMIN 2.8*   PROT 6.4      K 3.4*   CO2 21*      BUN 11   CREATININE 1.1   ALKPHOS 73   ALT 22   AST 16   BILITOT 0.5       Significant Diagnostics:  Last abdominal film showed a normal small bowel/gas pattern

## 2018-06-11 NOTE — ASSESSMENT & PLAN NOTE
This is likely a chronically scarred gallbladder with very little bile going in and out of it. I do not feel that this is the causes of abdominal pain

## 2018-06-11 NOTE — PLAN OF CARE
Problem: Patient Care Overview  Goal: Plan of Care Review  Outcome: Outcome(s) achieved Date Met: 06/11/18  Fall precautions maintained, pt free from falls/injuries  PT repositions and ambulates independently  Pt has no c/oa pain or nausea today  States he is ready to go  Po antibiotics  POC and medications reviewed with pt, pt verbalized understanding.  Side rails x 2 up, bed locked and low.  No signs/symptoms of acute distress.  Chart check done. Adequate for Dc.

## 2018-06-11 NOTE — NURSING
Discharge instructions, medications, and appointments reviewed with patient. Pt verbalized understanding. LDA removed. No further need/questions. Pt adequate for DC.  Pt had regular diet, stated he was a little nauseous, but is ready to go  Kris GUZMAN and dr mcdonnell notified. Ok to send pt home. Got prescription for po zofran

## 2018-06-12 LAB
BACTERIA BLD CULT: NORMAL
BACTERIA BLD CULT: NORMAL

## 2018-06-12 NOTE — PLAN OF CARE
06/12/18 0837   Final Note   Assessment Type Final Discharge Note   Discharge Disposition Home   Right Care Referral Info   Post Acute Recommendation No Care     Follow-up Information     Vj Gonzalez MD In 1 week.    Specialty:  Internal Medicine  Contact information:  [address] [facility] [place] [address] [phone]             Hussein Pearson MD In 1 week.    Specialties:  General Surgery, Bariatrics  Contact information:  [address]  [place] [address] [phone]

## 2019-04-12 ENCOUNTER — HOSPITAL ENCOUNTER (OUTPATIENT)
Dept: RADIOLOGY | Facility: HOSPITAL | Age: 69
Discharge: HOME | End: 2019-04-12
Attending: PAIN MEDICINE
Payer: MEDICARE

## 2019-04-12 DIAGNOSIS — M25.579 ANKLE PAIN: ICD-10-CM

## 2019-04-12 DIAGNOSIS — M25.579 PAIN IN JOINT, ANKLE AND FOOT: ICD-10-CM

## 2019-04-12 DIAGNOSIS — M25.569 PAIN IN JOINT, LOWER LEG: ICD-10-CM

## 2019-04-12 DIAGNOSIS — M25.569 KNEE PAIN: ICD-10-CM

## 2019-04-12 PROCEDURE — 72100 X-RAY EXAM L-S SPINE 2/3 VWS: CPT | Mod: TC,PO

## 2019-04-12 PROCEDURE — 72100 XR LUMBAR SPINE AP AND LATERAL: ICD-10-PCS | Mod: 26,,, | Performed by: RADIOLOGY

## 2019-04-12 PROCEDURE — 73562 X-RAY EXAM OF KNEE 3: CPT | Mod: TC,50,PO

## 2019-04-12 PROCEDURE — 73562 X-RAY EXAM OF KNEE 3: CPT | Mod: 26,50,, | Performed by: RADIOLOGY

## 2019-04-12 PROCEDURE — 73610 X-RAY EXAM OF ANKLE: CPT | Mod: 26,RT,, | Performed by: RADIOLOGY

## 2019-04-12 PROCEDURE — 73610 X-RAY EXAM OF ANKLE: CPT | Mod: TC,PO,RT

## 2019-04-12 PROCEDURE — 73610 XR ANKLE COMPLETE 3 VIEW RIGHT: ICD-10-PCS | Mod: 26,RT,, | Performed by: RADIOLOGY

## 2019-04-12 PROCEDURE — 72100 X-RAY EXAM L-S SPINE 2/3 VWS: CPT | Mod: 26,,, | Performed by: RADIOLOGY

## 2019-04-12 PROCEDURE — 73562 XR KNEE ORTHO BILAT: ICD-10-PCS | Mod: 26,50,, | Performed by: RADIOLOGY

## 2020-01-22 ENCOUNTER — HOSPITAL ENCOUNTER (OUTPATIENT)
Dept: RADIOLOGY | Facility: HOSPITAL | Age: 70
Discharge: HOME | End: 2020-01-22
Attending: INTERNAL MEDICINE
Payer: MEDICARE

## 2020-01-22 DIAGNOSIS — R63.4 LOSS OF WEIGHT: Primary | ICD-10-CM

## 2020-01-22 DIAGNOSIS — R63.4 LOSS OF WEIGHT: ICD-10-CM

## 2020-01-22 PROCEDURE — 71046 X-RAY EXAM CHEST 2 VIEWS: CPT | Mod: TC,PO

## 2020-01-22 PROCEDURE — 71046 XR CHEST PA AND LATERAL: ICD-10-PCS | Mod: 26,,, | Performed by: RADIOLOGY

## 2020-01-22 PROCEDURE — 71046 X-RAY EXAM CHEST 2 VIEWS: CPT | Mod: 26,,, | Performed by: RADIOLOGY

## 2020-01-28 ENCOUNTER — HOSPITAL ENCOUNTER (OUTPATIENT)
Dept: RADIOLOGY | Facility: HOSPITAL | Age: 70
Discharge: HOME | End: 2020-01-28
Attending: INTERNAL MEDICINE
Payer: MEDICARE

## 2020-01-28 DIAGNOSIS — R63.4 ABNORMAL WEIGHT LOSS: ICD-10-CM

## 2020-01-28 DIAGNOSIS — R63.4 UNEXPLAINED WEIGHT LOSS: ICD-10-CM

## 2020-01-28 DIAGNOSIS — R97.20 ELEVATED PSA: ICD-10-CM

## 2020-01-28 PROCEDURE — 74176 CT ABD & PELVIS W/O CONTRAST: CPT | Mod: TC,PO

## 2020-01-28 PROCEDURE — 25500020 PHARM REV CODE 255: Mod: PO

## 2020-01-28 PROCEDURE — 74176 CT ABD & PELVIS W/O CONTRAST: CPT | Mod: 26,,, | Performed by: RADIOLOGY

## 2020-01-28 PROCEDURE — 74176 CT ABDOMEN PELVIS WITHOUT CONTRAST: ICD-10-PCS | Mod: 26,,, | Performed by: RADIOLOGY

## 2020-01-28 RX ADMIN — IOHEXOL 30 ML: 350 INJECTION, SOLUTION INTRAVENOUS at 02:01

## 2020-09-18 DIAGNOSIS — S69.92XA INJURY OF LEFT HAND: Primary | ICD-10-CM

## 2020-09-23 ENCOUNTER — HOSPITAL ENCOUNTER (OUTPATIENT)
Dept: RADIOLOGY | Facility: HOSPITAL | Age: 70
Discharge: HOME | End: 2020-09-23
Attending: INTERNAL MEDICINE
Payer: MEDICARE

## 2020-09-23 DIAGNOSIS — S69.92XA INJURY OF LEFT HAND: ICD-10-CM

## 2020-09-23 PROCEDURE — 73130 X-RAY EXAM OF HAND: CPT | Mod: TC,PO,LT

## 2020-09-23 PROCEDURE — 73130 XR HAND COMPLETE 3 VIEW LEFT: ICD-10-PCS | Mod: 26,LT,, | Performed by: RADIOLOGY

## 2020-09-23 PROCEDURE — 73130 X-RAY EXAM OF HAND: CPT | Mod: 26,LT,, | Performed by: RADIOLOGY

## 2020-11-05 ENCOUNTER — TELEPHONE (OUTPATIENT)
Dept: RADIOLOGY | Facility: HOSPITAL | Age: 70
End: 2020-11-05

## 2020-11-09 ENCOUNTER — HOSPITAL ENCOUNTER (OUTPATIENT)
Dept: RADIOLOGY | Facility: HOSPITAL | Age: 70
Discharge: HOME | End: 2020-11-09
Attending: ANESTHESIOLOGY
Payer: MEDICARE

## 2020-11-09 DIAGNOSIS — M54.2 CERVICALGIA: ICD-10-CM

## 2020-11-09 DIAGNOSIS — M54.12 RADICULOPATHY OF CERVICAL SPINE: ICD-10-CM

## 2020-11-09 DIAGNOSIS — M54.16 RADICULOPATHY, LUMBAR REGION: ICD-10-CM

## 2020-11-09 DIAGNOSIS — M54.50 LUMBAGO: ICD-10-CM

## 2020-12-04 ENCOUNTER — TELEPHONE (OUTPATIENT)
Dept: RADIOLOGY | Facility: HOSPITAL | Age: 70
End: 2020-12-04

## 2020-12-11 ENCOUNTER — HOSPITAL ENCOUNTER (OUTPATIENT)
Dept: RADIOLOGY | Facility: HOSPITAL | Age: 70
Discharge: HOME | End: 2020-12-11
Attending: ANESTHESIOLOGY
Payer: MEDICARE

## 2020-12-11 DIAGNOSIS — M54.2 CERVICALGIA: ICD-10-CM

## 2020-12-11 DIAGNOSIS — M54.50 LUMBAGO: ICD-10-CM

## 2020-12-11 DIAGNOSIS — M54.16 LUMBAR RADICULOPATHY: ICD-10-CM

## 2020-12-11 DIAGNOSIS — M54.12 RADICULOPATHY, CERVICAL REGION: ICD-10-CM

## 2020-12-11 PROCEDURE — 72125 CT NECK SPINE W/O DYE: CPT | Mod: 26,,, | Performed by: RADIOLOGY

## 2020-12-11 PROCEDURE — 72131 CT LUMBAR SPINE W/O DYE: CPT | Mod: TC,PO

## 2020-12-11 PROCEDURE — 72131 CT LUMBAR SPINE WITHOUT CONTRAST: ICD-10-PCS | Mod: 26,,, | Performed by: RADIOLOGY

## 2020-12-11 PROCEDURE — 72125 CT CERVICAL SPINE WITHOUT CONTRAST: ICD-10-PCS | Mod: 26,,, | Performed by: RADIOLOGY

## 2020-12-11 PROCEDURE — 72131 CT LUMBAR SPINE W/O DYE: CPT | Mod: 26,,, | Performed by: RADIOLOGY

## 2020-12-11 PROCEDURE — 72125 CT NECK SPINE W/O DYE: CPT | Mod: TC,PO

## 2021-04-14 NOTE — ASSESSMENT & PLAN NOTE
-Improved with IVF's.   ACUTE PHYSICAL THERAPY GOALS:  (Developed with and agreed upon by patient and/or caregiver.)  UPDATED 21  LTG:  (1.)Mr. Rolly Tsang will move from supine to sit and sit to supine , scoot up and down and roll side to side in bed with MINIMAL ASSIST within 7 treatment day(s). (2.)Mr. Rolly Tsang will tolerate sitting up in recliner chair for 2 hours with stable vital signs to increase upright tolerance within 7 treatment day(s). (3.)Mr. Rolly Tsang will maintain static/dynamic sitting x 15 minutes with MINIMAL ASSIST for improved balance within 7 treatment days. (4.)Mr. Rolly Tsang will be able to hold head in cervical rotation left of midline for 2 minutes within 7 treatment days. (5.)Mr. Rolly Tsang will participate in therapeutic activity/exercises x 25 minutes for increased strength within 7 treatment days. PHYSICAL THERAPY: Daily Note and AM Treatment Day # 6    Michael Robert is a 67 y.o. male   PRIMARY DIAGNOSIS: Hemorrhagic cerebrovascular accident (CVA) ([facility] [address]  Hemorrhagic cerebrovascular accident (CVA) ([facility] [address] [I61.9]         ASSESSMENT:     REHAB RECOMMENDATIONS: CURRENT LEVEL OF FUNCTION:  (Most Recently Demonstrated)   Recommendation to date pending progress:  [address] [place] [address] vs. Short-term Rehab  Equipment:    To Be Determined Bed Mobility:   Maximal Assistance x 2  Sit to Stand:   Maximal Assistance x 2  Transfers:   Maximal Assistance x 2  Gait/Mobility:   Unable to perform     ASSESSMENT:  Mr. Rolly Tsang was supine in bed on arrival. EVD clamped for therapy. Rolling left and right with max assist x2. He tolerated sitting EOB working on balance and reaching across midline. Sit to stand with mod/max assist x2. Pt positioned in bed with needs in reach. SUBJECTIVE:   Mr. Rolly Tsang states, Dylan Exon it easy. \"    SOCIAL HISTORY/ LIVING ENVIRONMENT: See eval  Home Environment: Private residence  # Steps to Enter: 5  One/Two Story Residence: One story  Living Alone: No  Support Systems: Family member(s)  OBJECTIVE:     PAIN: VITAL SIGNS: LINES/DRAINS:   Pre Treatment:    Post Treatment: 0   IV and external male catheter, EVD  O2 Device: None (Room air)     MOBILITY: I Mod I S SBA CGA Min Mod Max Total  NT x2 Comments:   Bed Mobility    Rolling [] [] [] [] [] [] [] [x] [x] [] [x]    Supine to Sit [] [] [] [] [] [] [] [x] [x] [] [x]    Scooting [] [] [] [] [] [] [] [] [x] [] [x]    Sit to Supine [] [] [] [] [] [] [] [x] [x] [] [x]    Transfers    Sit to Stand [] [] [] [] [] [] [] [x] [] [] [x]    Bed to Chair [] [] [] [] [] [] [] [] [] [x] []    Stand to Sit [] [] [] [] [] [] [] [x] [] [] [x]    I=Independent, Mod I=Modified Independent, S=Supervision, SBA=Standby Assistance, CGA=Contact Guard Assistance,   Min=Minimal Assistance, Mod=Moderate Assistance, Max=Maximal Assistance, Total=Total Assistance, NT=Not Tested    BALANCE: Good Fair+ Fair Fair- Poor NT Comments   Sitting Static [] [] [] [] [x] []    Sitting Dynamic [] [] [] [] [x] []              Standing Static [] [] [] [] [x] []    Standing Dynamic [] [] [] [] [x] []      GAIT: I Mod I S SBA CGA Min Mod Max Total  NT x2 Comments:   Level of Assistance [] [] [] [] [] [] [] [x] [] [x] []    Distance NT    DME N/A    Gait Quality NT    Weightbearing  Status N/A     I=Independent, Mod I=Modified Independent, S=Supervision, SBA=Standby Assistance, CGA=Contact Guard Assistance,   Min=Minimal Assistance, Mod=Moderate Assistance, Max=Maximal Assistance, Total=Total Assistance, NT=Not Tested    PLAN:   FREQUENCY/DURATION: PT Plan of Care: 3 times/week for duration of hospital stay or until stated goals are met, whichever comes first.  TREATMENT:     TREATMENT:   ($$ Therapeutic Activity: 23-37 mins    )  Co-Treatment PT/OT necessary due to patient's decreased overall endurance/tolerance levels, as well as need for high level skilled assistance to complete functional transfers/mobility and functional tasks  Therapeutic Activity (24 Minutes):  Therapeutic activity included Rolling, Supine to Sit, Sit to Supine, Scooting, Transfer Training, Sitting balance  and Standing balance to improve functional Mobility, Strength, ROM and Activity tolerance.     TREATMENT GRID:  N/A    AFTER TREATMENT POSITION/PRECAUTIONS:  Bed, Needs within reach, RN notified and RUE restrained    INTERDISCIPLINARY COLLABORATION:  RN/PCT, PT/PTA and OT/LYNCH    TOTAL TREATMENT DURATION:  PT Patient Time In/Time Out  Time In: 1336  Time Out: [address] PTA

## 2021-06-15 DIAGNOSIS — I25.10 CORONARY ARTERIOSCLEROSIS: ICD-10-CM

## 2021-06-15 DIAGNOSIS — I73.9 PVD (PERIPHERAL VASCULAR DISEASE): ICD-10-CM

## 2021-06-15 DIAGNOSIS — C61 MALIGNANT NEOPLASM OF PROSTATE: ICD-10-CM

## 2021-06-15 DIAGNOSIS — K21.9 GERD (GASTROESOPHAGEAL REFLUX DISEASE): ICD-10-CM

## 2021-06-15 DIAGNOSIS — R63.4 UNINTENTIONAL WEIGHT LOSS: ICD-10-CM

## 2021-06-15 DIAGNOSIS — R19.5 OCCULT BLOOD IN STOOLS: Primary | ICD-10-CM

## 2021-06-15 DIAGNOSIS — Z79.02 LONG TERM (CURRENT) USE OF ANTITHROMBOTICS/ANTIPLATELETS: ICD-10-CM

## 2021-06-23 ENCOUNTER — TELEPHONE (OUTPATIENT)
Dept: RADIOLOGY | Facility: HOSPITAL | Age: 71
End: 2021-06-23

## 2021-06-25 ENCOUNTER — TELEPHONE (OUTPATIENT)
Dept: RADIOLOGY | Facility: HOSPITAL | Age: 71
End: 2021-06-25

## 2021-06-28 ENCOUNTER — HOSPITAL ENCOUNTER (OUTPATIENT)
Dept: RADIOLOGY | Facility: HOSPITAL | Age: 71
Discharge: HOME | End: 2021-06-28
Attending: INTERNAL MEDICINE
Payer: MEDICARE

## 2021-06-28 DIAGNOSIS — C61 MALIGNANT NEOPLASM OF PROSTATE: ICD-10-CM

## 2021-06-28 DIAGNOSIS — I25.10 CORONARY ARTERIOSCLEROSIS: ICD-10-CM

## 2021-06-28 DIAGNOSIS — K21.9 GERD (GASTROESOPHAGEAL REFLUX DISEASE): ICD-10-CM

## 2021-06-28 DIAGNOSIS — R63.4 UNINTENTIONAL WEIGHT LOSS: ICD-10-CM

## 2021-06-28 DIAGNOSIS — I73.9 PVD (PERIPHERAL VASCULAR DISEASE): ICD-10-CM

## 2021-06-28 DIAGNOSIS — R19.5 OCCULT BLOOD IN STOOLS: ICD-10-CM

## 2021-06-28 DIAGNOSIS — R19.5 OCCULT BLOOD IN STOOLS: Primary | ICD-10-CM

## 2021-06-28 DIAGNOSIS — Z79.02 LONG TERM (CURRENT) USE OF ANTITHROMBOTICS/ANTIPLATELETS: ICD-10-CM

## 2021-07-27 DIAGNOSIS — R63.4 LOSS OF WEIGHT: Primary | ICD-10-CM

## 2021-07-30 ENCOUNTER — LAB VISIT (OUTPATIENT)
Dept: LAB | Facility: HOSPITAL | Age: 71
End: 2021-07-30
Attending: INTERNAL MEDICINE
Payer: MEDICARE

## 2021-07-30 DIAGNOSIS — R63.4 LOSS OF WEIGHT: ICD-10-CM

## 2021-07-30 LAB
ANION GAP SERPL CALC-SCNC: 13 MMOL/L (ref 8–16)
BUN SERPL-MCNC: 17 MG/DL (ref 8–23)
CALCIUM SERPL-MCNC: 9.1 MG/DL (ref 8.7–10.5)
CHLORIDE SERPL-SCNC: 106 MMOL/L (ref 95–110)
CO2 SERPL-SCNC: 23 MMOL/L (ref 23–29)
CREAT SERPL-MCNC: 1.1 MG/DL (ref 0.5–1.4)
EST. GFR  (AFRICAN AMERICAN): >60 ML/MIN/1.73 M^2
EST. GFR  (NON AFRICAN AMERICAN): >60 ML/MIN/1.73 M^2
GLUCOSE SERPL-MCNC: 120 MG/DL (ref 70–110)
POTASSIUM SERPL-SCNC: 4.4 MMOL/L (ref 3.5–5.1)
SODIUM SERPL-SCNC: 142 MMOL/L (ref 136–145)

## 2021-07-30 PROCEDURE — 36415 COLL VENOUS BLD VENIPUNCTURE: CPT | Mod: PO | Performed by: INTERNAL MEDICINE

## 2021-07-30 PROCEDURE — 80048 BASIC METABOLIC PNL TOTAL CA: CPT | Mod: PO | Performed by: INTERNAL MEDICINE

## 2022-03-04 ENCOUNTER — HOSPITAL ENCOUNTER (OUTPATIENT)
Dept: RADIOLOGY | Facility: HOSPITAL | Age: 72
Discharge: HOME | End: 2022-03-04
Attending: INTERNAL MEDICINE
Payer: MEDICARE

## 2022-03-04 DIAGNOSIS — M25.561 RIGHT KNEE PAIN: ICD-10-CM

## 2022-03-04 PROCEDURE — 73562 XR KNEE ORTHO RIGHT: ICD-10-PCS | Mod: 26,RT,, | Performed by: RADIOLOGY

## 2022-03-04 PROCEDURE — 73560 XR KNEE ORTHO RIGHT: ICD-10-PCS | Mod: 26,LT,, | Performed by: RADIOLOGY

## 2022-03-04 PROCEDURE — 73562 X-RAY EXAM OF KNEE 3: CPT | Mod: 26,RT,, | Performed by: RADIOLOGY

## 2022-03-04 PROCEDURE — 72100 X-RAY EXAM L-S SPINE 2/3 VWS: CPT | Mod: 26,,, | Performed by: RADIOLOGY

## 2022-03-04 PROCEDURE — 72100 XR LUMBAR SPINE AP AND LATERAL: ICD-10-PCS | Mod: 26,,, | Performed by: RADIOLOGY

## 2022-03-04 PROCEDURE — 73560 X-RAY EXAM OF KNEE 1 OR 2: CPT | Mod: 26,LT,, | Performed by: RADIOLOGY

## 2022-03-04 PROCEDURE — 73560 X-RAY EXAM OF KNEE 1 OR 2: CPT | Mod: 59,TC,PO,LT

## 2022-03-04 PROCEDURE — 72100 X-RAY EXAM L-S SPINE 2/3 VWS: CPT | Mod: TC,PO

## 2023-01-31 ENCOUNTER — HOSPITAL ENCOUNTER (EMERGENCY)
Facility: HOSPITAL | Age: 73
Discharge: ANOTHER HOSPITAL | End: 2023-01-31
Attending: EMERGENCY MEDICINE
Payer: MEDICARE

## 2023-01-31 VITALS
OXYGEN SATURATION: 99 % | TEMPERATURE: 99 F | DIASTOLIC BLOOD PRESSURE: 93 MMHG | BODY MASS INDEX: 28.07 KG/M2 | SYSTOLIC BLOOD PRESSURE: 194 MMHG | RESPIRATION RATE: 21 BRPM | WEIGHT: 207.25 LBS | HEART RATE: 83 BPM | HEIGHT: 72 IN

## 2023-01-31 DIAGNOSIS — E87.6 HYPOKALEMIA: ICD-10-CM

## 2023-01-31 DIAGNOSIS — J44.1 COPD WITH ACUTE EXACERBATION: ICD-10-CM

## 2023-01-31 DIAGNOSIS — E87.20 LACTIC ACIDOSIS: ICD-10-CM

## 2023-01-31 DIAGNOSIS — R94.31 ABNORMAL ELECTROCARDIOGRAM (ECG) (EKG): ICD-10-CM

## 2023-01-31 DIAGNOSIS — I47.29 NSVT (NONSUSTAINED VENTRICULAR TACHYCARDIA): ICD-10-CM

## 2023-01-31 DIAGNOSIS — L03.115 CELLULITIS OF RIGHT LEG: ICD-10-CM

## 2023-01-31 DIAGNOSIS — I20.0 UNSTABLE ANGINA: Primary | ICD-10-CM

## 2023-01-31 DIAGNOSIS — R07.9 CHEST PAIN, UNSPECIFIED TYPE: ICD-10-CM

## 2023-01-31 DIAGNOSIS — R06.02 SOB (SHORTNESS OF BREATH): ICD-10-CM

## 2023-01-31 LAB
ALBUMIN SERPL BCP-MCNC: 4.2 G/DL (ref 3.5–5.2)
ALP SERPL-CCNC: 80 U/L (ref 55–135)
ALT SERPL W/O P-5'-P-CCNC: 17 U/L (ref 10–44)
ANION GAP SERPL CALC-SCNC: 17 MMOL/L (ref 8–16)
APTT BLDCRRT: 27.9 SEC (ref 21–32)
APTT BLDCRRT: 28.2 SEC (ref 21–32)
AST SERPL-CCNC: 33 U/L (ref 10–40)
BASOPHILS # BLD AUTO: 0.11 K/UL (ref 0–0.2)
BASOPHILS NFR BLD: 1.2 % (ref 0–1.9)
BILIRUB SERPL-MCNC: 0.9 MG/DL (ref 0.1–1)
BNP SERPL-MCNC: 136 PG/ML (ref 0–99)
BUN SERPL-MCNC: 23 MG/DL (ref 8–23)
CALCIUM SERPL-MCNC: 9.8 MG/DL (ref 8.7–10.5)
CHLORIDE SERPL-SCNC: 101 MMOL/L (ref 95–110)
CO2 SERPL-SCNC: 22 MMOL/L (ref 23–29)
CREAT SERPL-MCNC: 1.1 MG/DL (ref 0.5–1.4)
CTP QC/QA: YES
CTP QC/QA: YES
DIFFERENTIAL METHOD: ABNORMAL
EOSINOPHIL # BLD AUTO: 0 K/UL (ref 0–0.5)
EOSINOPHIL NFR BLD: 0.3 % (ref 0–8)
ERYTHROCYTE [DISTWIDTH] IN BLOOD BY AUTOMATED COUNT: 13.6 % (ref 11.5–14.5)
EST. GFR  (NO RACE VARIABLE): >60 ML/MIN/1.73 M^2
GLUCOSE SERPL-MCNC: 109 MG/DL (ref 70–110)
HCT VFR BLD AUTO: 49.9 % (ref 40–54)
HGB BLD-MCNC: 16.3 G/DL (ref 14–18)
IMM GRANULOCYTES # BLD AUTO: 0.06 K/UL (ref 0–0.04)
IMM GRANULOCYTES NFR BLD AUTO: 0.6 % (ref 0–0.5)
INR PPP: 1 (ref 0.8–1.2)
LACTATE SERPL-SCNC: 2.1 MMOL/L (ref 0.5–2.2)
LACTATE SERPL-SCNC: 3 MMOL/L (ref 0.5–2.2)
LYMPHOCYTES # BLD AUTO: 1.2 K/UL (ref 1–4.8)
LYMPHOCYTES NFR BLD: 12.8 % (ref 18–48)
MCH RBC QN AUTO: 29.1 PG (ref 27–31)
MCHC RBC AUTO-ENTMCNC: 32.7 G/DL (ref 32–36)
MCV RBC AUTO: 89 FL (ref 82–98)
MONOCYTES # BLD AUTO: 1 K/UL (ref 0.3–1)
MONOCYTES NFR BLD: 10.2 % (ref 4–15)
NEUTROPHILS # BLD AUTO: 7 K/UL (ref 1.8–7.7)
NEUTROPHILS NFR BLD: 74.9 % (ref 38–73)
NRBC BLD-RTO: 0 /100 WBC
PLATELET # BLD AUTO: 278 K/UL (ref 150–450)
PMV BLD AUTO: 11.5 FL (ref 9.2–12.9)
POC MOLECULAR INFLUENZA A AGN: NEGATIVE
POC MOLECULAR INFLUENZA B AGN: NEGATIVE
POTASSIUM SERPL-SCNC: 3.2 MMOL/L (ref 3.5–5.1)
PROT SERPL-MCNC: 8.1 G/DL (ref 6–8.4)
PROTHROMBIN TIME: 11.3 SEC (ref 9–12.5)
RBC # BLD AUTO: 5.6 M/UL (ref 4.6–6.2)
SARS-COV-2 RDRP RESP QL NAA+PROBE: NEGATIVE
SODIUM SERPL-SCNC: 140 MMOL/L (ref 136–145)
TROPONIN I SERPL DL<=0.01 NG/ML-MCNC: 0.08 NG/ML (ref 0–0.03)
TROPONIN I SERPL DL<=0.01 NG/ML-MCNC: 0.08 NG/ML (ref 0–0.03)
WBC # BLD AUTO: 9.39 K/UL (ref 3.9–12.7)

## 2023-01-31 PROCEDURE — 87040 BLOOD CULTURE FOR BACTERIA: CPT | Performed by: EMERGENCY MEDICINE

## 2023-01-31 PROCEDURE — 85610 PROTHROMBIN TIME: CPT | Mod: ER | Performed by: EMERGENCY MEDICINE

## 2023-01-31 PROCEDURE — 63600175 PHARM REV CODE 636 W HCPCS: Mod: ER | Performed by: EMERGENCY MEDICINE

## 2023-01-31 PROCEDURE — 94640 AIRWAY INHALATION TREATMENT: CPT | Mod: ER

## 2023-01-31 PROCEDURE — 25000242 PHARM REV CODE 250 ALT 637 W/ HCPCS: Mod: ER | Performed by: EMERGENCY MEDICINE

## 2023-01-31 PROCEDURE — 85730 THROMBOPLASTIN TIME PARTIAL: CPT | Mod: ER | Performed by: EMERGENCY MEDICINE

## 2023-01-31 PROCEDURE — 87502 INFLUENZA DNA AMP PROBE: CPT | Mod: ER

## 2023-01-31 PROCEDURE — 84484 ASSAY OF TROPONIN QUANT: CPT | Mod: ER | Performed by: EMERGENCY MEDICINE

## 2023-01-31 PROCEDURE — 99291 CRITICAL CARE FIRST HOUR: CPT | Mod: ER

## 2023-01-31 PROCEDURE — 96375 TX/PRO/DX INJ NEW DRUG ADDON: CPT | Mod: ER

## 2023-01-31 PROCEDURE — 80053 COMPREHEN METABOLIC PANEL: CPT | Mod: ER | Performed by: EMERGENCY MEDICINE

## 2023-01-31 PROCEDURE — 87635 SARS-COV-2 COVID-19 AMP PRB: CPT | Mod: ER | Performed by: EMERGENCY MEDICINE

## 2023-01-31 PROCEDURE — 25000003 PHARM REV CODE 250: Mod: ER | Performed by: EMERGENCY MEDICINE

## 2023-01-31 PROCEDURE — 85025 COMPLETE CBC W/AUTO DIFF WBC: CPT | Mod: ER | Performed by: EMERGENCY MEDICINE

## 2023-01-31 PROCEDURE — 96366 THER/PROPH/DIAG IV INF ADDON: CPT | Mod: ER

## 2023-01-31 PROCEDURE — 83880 ASSAY OF NATRIURETIC PEPTIDE: CPT | Mod: ER | Performed by: EMERGENCY MEDICINE

## 2023-01-31 PROCEDURE — 83605 ASSAY OF LACTIC ACID: CPT | Mod: ER | Performed by: EMERGENCY MEDICINE

## 2023-01-31 PROCEDURE — 96365 THER/PROPH/DIAG IV INF INIT: CPT | Mod: ER

## 2023-01-31 RX ORDER — DIPHENHYDRAMINE HCL 25 MG
50 CAPSULE ORAL
Status: COMPLETED | OUTPATIENT
Start: 2023-01-31 | End: 2023-01-31

## 2023-01-31 RX ORDER — IPRATROPIUM BROMIDE AND ALBUTEROL SULFATE 2.5; .5 MG/3ML; MG/3ML
3 SOLUTION RESPIRATORY (INHALATION)
Status: COMPLETED | OUTPATIENT
Start: 2023-01-31 | End: 2023-01-31

## 2023-01-31 RX ORDER — ASPIRIN 325 MG
325 TABLET ORAL
Status: COMPLETED | OUTPATIENT
Start: 2023-01-31 | End: 2023-01-31

## 2023-01-31 RX ORDER — LEVOFLOXACIN 5 MG/ML
750 INJECTION, SOLUTION INTRAVENOUS
Status: COMPLETED | OUTPATIENT
Start: 2023-01-31 | End: 2023-01-31

## 2023-01-31 RX ORDER — HYDROCODONE BITARTRATE AND ACETAMINOPHEN 5; 325 MG/1; MG/1
2 TABLET ORAL
Status: COMPLETED | OUTPATIENT
Start: 2023-01-31 | End: 2023-01-31

## 2023-01-31 RX ORDER — METHYLPREDNISOLONE SOD SUCC 125 MG
125 VIAL (EA) INJECTION
Status: COMPLETED | OUTPATIENT
Start: 2023-01-31 | End: 2023-01-31

## 2023-01-31 RX ORDER — METOPROLOL TARTRATE 1 MG/ML
5 INJECTION, SOLUTION INTRAVENOUS
Status: COMPLETED | OUTPATIENT
Start: 2023-01-31 | End: 2023-01-31

## 2023-01-31 RX ORDER — HEPARIN SODIUM,PORCINE/D5W 25000/250
0-40 INTRAVENOUS SOLUTION INTRAVENOUS CONTINUOUS
Status: DISCONTINUED | OUTPATIENT
Start: 2023-01-31 | End: 2023-02-01 | Stop reason: HOSPADM

## 2023-01-31 RX ORDER — HEPARIN SODIUM,PORCINE/D5W 25000/250
0-40 INTRAVENOUS SOLUTION INTRAVENOUS CONTINUOUS
Status: DISCONTINUED | OUTPATIENT
Start: 2023-01-31 | End: 2023-01-31

## 2023-01-31 RX ADMIN — METOROPROLOL TARTRATE 5 MG: 5 INJECTION, SOLUTION INTRAVENOUS at 10:01

## 2023-01-31 RX ADMIN — HYDROCODONE BITARTRATE AND ACETAMINOPHEN 2 TABLET: 5; 325 TABLET ORAL at 07:01

## 2023-01-31 RX ADMIN — IPRATROPIUM BROMIDE AND ALBUTEROL SULFATE 3 ML: 2.5; .5 SOLUTION RESPIRATORY (INHALATION) at 06:01

## 2023-01-31 RX ADMIN — DIPHENHYDRAMINE HYDROCHLORIDE 50 MG: 25 CAPSULE ORAL at 09:01

## 2023-01-31 RX ADMIN — POTASSIUM BICARBONATE 40 MEQ: 391 TABLET, EFFERVESCENT ORAL at 08:01

## 2023-01-31 RX ADMIN — ASPIRIN 325 MG: 325 TABLET ORAL at 09:01

## 2023-01-31 RX ADMIN — SODIUM CHLORIDE, POTASSIUM CHLORIDE, SODIUM LACTATE AND CALCIUM CHLORIDE 2328 ML: 600; 310; 30; 20 INJECTION, SOLUTION INTRAVENOUS at 08:01

## 2023-01-31 RX ADMIN — LEVOFLOXACIN 750 MG: 750 INJECTION, SOLUTION INTRAVENOUS at 08:01

## 2023-01-31 RX ADMIN — METHYLPREDNISOLONE SODIUM SUCCINATE 125 MG: 125 INJECTION, POWDER, FOR SOLUTION INTRAMUSCULAR; INTRAVENOUS at 06:01

## 2023-01-31 RX ADMIN — NITROGLYCERIN 0.5 INCH: 20 OINTMENT TOPICAL at 09:01

## 2023-01-31 RX ADMIN — HEPARIN SODIUM 40 UNITS/KG/HR: 10000 INJECTION, SOLUTION INTRAVENOUS at 10:01

## 2023-02-01 NOTE — ED PROVIDER NOTES
Encounter Date: 1/31/2023       History     Chief Complaint   Patient presents with    Shortness of Breath     Vinicius Sung is a 72 y.o. male smoker w/ PMH CAD s/p MI and CABG, COPD, PAD, who presents with 5 days of gradual onset, worsening, moderate to severe SOB with associated intermittent chest tightness and pressure at home. The chest pain recurred most recently at 2:00pm today (1/31/23).     He has a chronic cough, unchanged.     He also c/o red rash to right shin and top of foot worsening over the past week.     Review of patient's allergies indicates:  No Known Allergies  Past Medical History:   Diagnosis Date    CAD (coronary artery disease)     COPD (chronic obstructive pulmonary disease)     Coronary artery disease     GERD (gastroesophageal reflux disease)     Major depressive disorder     PAD (peripheral artery disease)     ST elevation (STEMI) myocardial infarction     Myocardial Infarction     Past Surgical History:   Procedure Laterality Date    BACK SURGERY      CARDIAC SURGERY      CABG    FRACTURE SURGERY Right     kalyan in lower leg - later removed for infection and replaced with AB impregnated kalyan    MN AMPUTATION LOW LEG THRU TIB/FIB Right     Amputation, Below The Knee    TONSILLECTOMY, ADENOIDECTOMY       No family history on file.  Social History     Tobacco Use    Smoking status: Former     Types: Cigarettes    Tobacco comments:     3ppd smoker x 35yrs, recently quit in past week or so   Substance Use Topics    Alcohol use: No     Comment: quit 14 yrs ago    Drug use: No     Review of Systems   Constitutional:  Positive for fatigue. Negative for fever.   HENT: Negative.     Eyes: Negative.    Respiratory:  Positive for cough and shortness of breath.    Cardiovascular:  Positive for chest pain.   Gastrointestinal: Negative.    Endocrine: Negative.    Genitourinary: Negative.    Musculoskeletal: Negative.    Skin: Negative.    Allergic/Immunologic: Negative.    Neurological: Negative.     Hematological: Negative.    Psychiatric/Behavioral: Negative.     All other systems reviewed and are negative.    Physical Exam     Initial Vitals [01/31/23 1759]   BP Pulse Resp Temp SpO2   (!) 146/94 110 (!) 36 98.7 °F (37.1 °C) 97 %      MAP       --         Physical Exam    Nursing note and vitals reviewed.  Constitutional: He appears well-developed and well-nourished. He appears distressed.   HENT:   Head: Normocephalic and atraumatic.   Eyes: Conjunctivae and EOM are normal. Pupils are equal, round, and reactive to light.   Neck: Neck supple.   Cardiovascular:  Regular rhythm, normal heart sounds and intact distal pulses.           Tachycardic. Midline sternotomy scar noted.    Pulmonary/Chest: He is in respiratory distress. He has wheezes. He has rales (mild, RLL). He exhibits no tenderness.   tachypnea   Abdominal: Abdomen is soft. He exhibits no distension. There is no abdominal tenderness.   Musculoskeletal:         General: No edema. Normal range of motion.      Cervical back: Neck supple.     Neurological: He is alert and oriented to person, place, and time. He has normal strength.   Skin: Skin is warm and dry. Capillary refill takes less than 2 seconds. Rash (RLE erythema and tenderness over proximal dorsal foot, and shin) noted.   Psychiatric: He has a normal mood and affect. His behavior is normal. Judgment and thought content normal.       ED Course   Critical Care    Date/Time: 1/31/2023 6:38 PM  Performed by: Killian Barriga MD  Authorized by: Killian Barriga MD   Direct patient critical care time: 30 minutes  Additional history critical care time: 16 minutes  Ordering / reviewing critical care time: 12 minutes  Documentation critical care time: 12 minutes  Consulting other physicians critical care time: 7 minutes  Total critical care time (exclusive of procedural time) : 77 minutes  Critical care time was exclusive of separately billable procedures and treating other patients and  teaching time.  Critical care was necessary to treat or prevent imminent or life-threatening deterioration of the following conditions: respiratory failure and cardiac failure (severe COPD exacerbation; unstable angina requiring heparin).  Critical care was time spent personally by me on the following activities: blood draw for specimens, development of treatment plan with patient or surrogate, interpretation of cardiac output measurements, evaluation of patient's response to treatment, examination of patient, obtaining history from patient or surrogate, ordering and performing treatments and interventions, ordering and review of laboratory studies, ordering and review of radiographic studies, pulse oximetry, re-evaluation of patient's condition and review of old charts.      Labs Reviewed   CBC W/ AUTO DIFFERENTIAL - Abnormal; Notable for the following components:       Result Value    Immature Granulocytes 0.6 (*)     Immature Grans (Abs) 0.06 (*)     Gran % 74.9 (*)     Lymph % 12.8 (*)     All other components within normal limits   COMPREHENSIVE METABOLIC PANEL - Abnormal; Notable for the following components:    Potassium 3.2 (*)     CO2 22 (*)     Anion Gap 17 (*)     All other components within normal limits   TROPONIN I - Abnormal; Notable for the following components:    Troponin I 0.076 (*)     All other components within normal limits   B-TYPE NATRIURETIC PEPTIDE - Abnormal; Notable for the following components:     (*)     All other components within normal limits   LACTIC ACID, PLASMA - Abnormal; Notable for the following components:    Lactate (Lactic Acid) 3.0 (*)     All other components within normal limits   CULTURE, BLOOD   CULTURE, BLOOD   PROTIME-INR   APTT   LACTIC ACID, PLASMA   TROPONIN I   APTT   SARS-COV-2 RDRP GENE    Narrative:     .This test utilizes isothermal nucleic acid amplification technology to detect the SARS-CoV-2 RdRp nucleic acid segment. The analytical sensitivity  (limit of detection) is 500 copies/swab.     A POSITIVE result is indicative of the presence of SARS-CoV-2 RNA; clinical correlation with patient history and other diagnostic information is necessary to determine patient infection status.    A NEGATIVE result means that SARS-CoV-2 nucleic acids are not present above the limit of detection. A NEGATIVE result should be treated as presumptive. It does not rule out the possibility of COVID-19 and should not be the sole basis for treatment decisions. If COVID-19 is strongly suspected based on clinical and exposure history, re-testing using an alternate molecular assay should be considered.     This test is only for use under the Food and Drug Administration s Emergency Use Authorization (EUA).     Commercial kits are provided by [employer]. Performance characteristics of the EUA have been independently verified by [facility] Department of Pathology and Laboratory Medicine.   _________________________________________________________________   The authorized Fact Sheet for Healthcare Providers and the authorized Fact Sheet for Patients of the ID NOW COVID-19 are available on the FDA website:    https://www.fda.gov/media/334893/download      https://www.fda.gov/media/972863/download       POCT INFLUENZA A/B MOLECULAR     EKG Readings: (Independently Interpreted)   EKG Interpretation by Emergency Physician: Killian Barriga MD  Rhythm: sinus tachycardia with frequent PVCs  Rate: 113 bpm  Inferolateral ST depression, new from comparison EKG in 2018  No STEMI     Imaging Results              X-Ray Chest AP Portable (Final result)  Result time 01/31/23 18:46:19      Final result by Lane Ferreira MD (01/31/23 18:46:19)                   Impression:      Similar findings to prior exam with basilar opacities and mild perihilar reticular opacities.      Electronically signed by: Jhon Sherman  Date:    01/31/2023  Time:    18:46               Narrative:     EXAMINATION:  XR CHEST AP PORTABLE    CLINICAL HISTORY:  Shortness of breath    TECHNIQUE:  Single frontal view of the chest was performed.    COMPARISON:  None    FINDINGS:  Lumbar hardware identified.  Median sternotomy.  Elevated right hemidiaphragm with mild basilar atelectasis or scarring.  Blunting of the bilateral costophrenic angle.  Trace pleural effusions not excluded.  Question mild perihilar edema with interstitial opacities.    Bones are intact.                                       Medications   lactated ringers bolus 2,328 mL (2,328 mLs Intravenous New Bag 1/31/23 2035)   heparin 25,000 units in dextrose 5% (100 units/ml) IV bolus from bag - ADDITIONAL PRN BOLUS - 60 units/kg (max bolus 4000 units) (has no administration in time range)   heparin 25,000 units in dextrose 5% (100 units/ml) IV bolus from bag - ADDITIONAL PRN BOLUS - 30 units/kg (max bolus 4000 units) (has no administration in time range)   heparin 25,000 units in dextrose 5% 250 mL (100 units/mL) infusion LOW INTENSITY nomogram - [facility] (40 Units/kg/hr × 94 kg Intravenous New Bag 1/31/23 2208)   methylPREDNISolone sodium succinate injection 125 mg (125 mg Intravenous Given 1/31/23 1846)   albuterol-ipratropium 2.5 mg-0.5 mg/3 mL nebulizer solution 3 mL (3 mLs Nebulization Given 1/31/23 1825)   HYDROcodone-acetaminophen 5-325 mg per tablet 2 tablet (2 tablets Oral Given 1/31/23 1919)   potassium bicarbonate disintegrating tablet 40 mEq (40 mEq Oral Given 1/31/23 2026)   levoFLOXacin 750 mg/150 mL IVPB 750 mg (750 mg Intravenous New Bag 1/31/23 2042)   diphenhydrAMINE capsule 50 mg (50 mg Oral Given 1/31/23 2112)   aspirin tablet 325 mg (325 mg Oral Given 1/31/23 2112)   nitroGLYCERIN 2% TD oint ointment 0.5 inch (0.5 inches Topical (Top) Given 1/31/23 2111)   metoprolol injection 5 mg (5 mg Intravenous Given 1/31/23 2201)   heparin 25,000 units in dextrose 5% (100 units/ml) IV bolus from bag INITIAL BOLUS (max bolus 4000 units) (4,000 Units  Intravenous Bolus from Bag 1/31/23 2209)                 ED Course as of 01/31/23 2218   Tue Jan 31, 2023 2150 Patient had run of NSVT for ~6 seconds. Giving metoprolol 5 mg IV (patient likely having some beta blocker withdrawal effect) and heparin is being started for unstable angina.  [ND]      ED Course User Index  [ND] Killian Barriga MD               Recent Results (from the past 24 hour(s))   CBC auto differential    Collection Time: 01/31/23  6:39 PM   Result Value Ref Range    WBC 9.39 3.90 - 12.70 K/uL    RBC 5.60 4.60 - 6.20 M/uL    Hemoglobin 16.3 14.0 - 18.0 g/dL    Hematocrit 49.9 40.0 - 54.0 %    MCV 89 82 - 98 fL    MCH 29.1 27.0 - 31.0 pg    MCHC 32.7 32.0 - 36.0 g/dL    RDW 13.6 11.5 - 14.5 %    Platelets 278 150 - 450 K/uL    MPV 11.5 9.2 - 12.9 fL    Immature Granulocytes 0.6 (H) 0.0 - 0.5 %    Gran # (ANC) 7.0 1.8 - 7.7 K/uL    Immature Grans (Abs) 0.06 (H) 0.00 - 0.04 K/uL    Lymph # 1.2 1.0 - 4.8 K/uL    Mono # 1.0 0.3 - 1.0 K/uL    Eos # 0.0 0.0 - 0.5 K/uL    Baso # 0.11 0.00 - 0.20 K/uL    nRBC 0 0 /100 WBC    Gran % 74.9 (H) 38.0 - 73.0 %    Lymph % 12.8 (L) 18.0 - 48.0 %    Mono % 10.2 4.0 - 15.0 %    Eosinophil % 0.3 0.0 - 8.0 %    Basophil % 1.2 0.0 - 1.9 %    Differential Method Automated    Comprehensive metabolic panel    Collection Time: 01/31/23  6:39 PM   Result Value Ref Range    Sodium 140 136 - 145 mmol/L    Potassium 3.2 (L) 3.5 - 5.1 mmol/L    Chloride 101 95 - 110 mmol/L    CO2 22 (L) 23 - 29 mmol/L    Glucose 109 70 - 110 mg/dL    BUN 23 8 - 23 mg/dL    Creatinine 1.1 0.5 - 1.4 mg/dL    Calcium 9.8 8.7 - 10.5 mg/dL    Total Protein 8.1 6.0 - 8.4 g/dL    Albumin 4.2 3.5 - 5.2 g/dL    Total Bilirubin 0.9 0.1 - 1.0 mg/dL    Alkaline Phosphatase 80 55 - 135 U/L    AST 33 10 - 40 U/L    ALT 17 10 - 44 U/L    Anion Gap 17 (H) 8 - 16 mmol/L    eGFR >60.0 >60 mL/min/1.73 m^2   Troponin I    Collection Time: 01/31/23  6:39 PM   Result Value Ref Range    Troponin I 0.076 (H)  0.000 - 0.026 ng/mL   Brain natriuretic peptide    Collection Time: 01/31/23  6:39 PM   Result Value Ref Range     (H) 0 - 99 pg/mL   Protime-INR    Collection Time: 01/31/23  6:39 PM   Result Value Ref Range    Prothrombin Time 11.3 9.0 - 12.5 sec    INR 1.0 0.8 - 1.2   APTT    Collection Time: 01/31/23  6:39 PM   Result Value Ref Range    aPTT 27.9 21.0 - 32.0 sec   Lactic acid, plasma    Collection Time: 01/31/23  6:39 PM   Result Value Ref Range    Lactate (Lactic Acid) 3.0 (H) 0.5 - 2.2 mmol/L   POCT Influenza A/B Molecular    Collection Time: 01/31/23  7:18 PM   Result Value Ref Range    POC Molecular Influenza A Ag Negative Negative, Not Reported    POC Molecular Influenza B Ag Negative Negative, Not Reported     Acceptable Yes    POCT COVID-19 Rapid Screening    Collection Time: 01/31/23  7:19 PM   Result Value Ref Range    POC Rapid COVID Negative Negative     Acceptable Yes    Lactic acid, plasma    Collection Time: 01/31/23  8:32 PM   Result Value Ref Range    Lactate (Lactic Acid) 2.1 0.5 - 2.2 mmol/L       Medications   lactated ringers bolus 2,328 mL (2,328 mLs Intravenous New Bag 1/31/23 2035)   heparin 25,000 units in dextrose 5% (100 units/ml) IV bolus from bag - ADDITIONAL PRN BOLUS - 60 units/kg (max bolus 4000 units) (has no administration in time range)   heparin 25,000 units in dextrose 5% (100 units/ml) IV bolus from bag - ADDITIONAL PRN BOLUS - 30 units/kg (max bolus 4000 units) (has no administration in time range)   heparin 25,000 units in dextrose 5% 250 mL (100 units/mL) infusion LOW INTENSITY nomogram - [facility] (40 Units/kg/hr × 94 kg Intravenous New Bag 1/31/23 2208)   methylPREDNISolone sodium succinate injection 125 mg (125 mg Intravenous Given 1/31/23 1846)   albuterol-ipratropium 2.5 mg-0.5 mg/3 mL nebulizer solution 3 mL (3 mLs Nebulization Given 1/31/23 1825)   HYDROcodone-acetaminophen 5-325 mg per tablet 2 tablet (2 tablets Oral Given 1/31/23  1919)   potassium bicarbonate disintegrating tablet 40 mEq (40 mEq Oral Given 1/31/23 2026)   levoFLOXacin 750 mg/150 mL IVPB 750 mg (750 mg Intravenous New Bag 1/31/23 2042)   diphenhydrAMINE capsule 50 mg (50 mg Oral Given 1/31/23 2112)   aspirin tablet 325 mg (325 mg Oral Given 1/31/23 2112)   nitroGLYCERIN 2% TD oint ointment 0.5 inch (0.5 inches Topical (Top) Given 1/31/23 2111)   metoprolol injection 5 mg (5 mg Intravenous Given 1/31/23 2201)   heparin 25,000 units in dextrose 5% (100 units/ml) IV bolus from bag INITIAL BOLUS (max bolus 4000 units) (4,000 Units Intravenous Bolus from Bag 1/31/23 2209)       Dr. Coronel, ER Physician at [facility], accepts patient for transfer.      Accepting MD: Dr. Coronel  Transfer type:  ED to ED      Patient w/ CABG has COPD exacerbation as well as intermittent chest pressure with elevated trop (no baseline available to compare) and inferolateral ST depressions with runs of 5-to-10-beat NSVT.     Started heparin.     Gave IV abx for RLE cellulitis with +SIRS +lactic acidosis.       Clinical Impression:   Final diagnoses:  [R06.02] SOB (shortness of breath)  [R07.9] Chest pain, unspecified type  [R94.31] Abnormal electrocardiogram (ECG) (EKG)  [J44.1] COPD with acute exacerbation  [L03.115] Cellulitis of right leg  [E87.20] Lactic acidosis  [E87.6] Hypokalemia  [I47.29] NSVT (nonsustained ventricular tachycardia)  [I20.0] Unstable angina (Primary)        ED Disposition Condition    Transfer to Another Facility Stable                Killian Barriga MD  01/31/23 2215

## 2023-02-01 NOTE — ED NOTES
Patient stable. Alert and oriented X4. Speaking in full sentences. Given requested blankets and glasses. Wallet and phone in shoes per patients request. Belongings given to EMS. Heprin drip and LR infusing.

## 2023-02-01 NOTE — ED NOTES
Attempting to help patient get comfortable. CO back pain due to position. States chronic back pain. Patient drinking water. Given warm blankets.

## 2023-02-06 LAB
BACTERIA BLD CULT: NORMAL
BACTERIA BLD CULT: NORMAL

## 2023-06-08 ENCOUNTER — HOSPITAL ENCOUNTER (EMERGENCY)
Facility: HOSPITAL | Age: 73
Discharge: HOME | End: 2023-06-08
Attending: EMERGENCY MEDICINE
Payer: MEDICARE

## 2023-06-08 VITALS
WEIGHT: 170 LBS | DIASTOLIC BLOOD PRESSURE: 98 MMHG | SYSTOLIC BLOOD PRESSURE: 179 MMHG | HEART RATE: 71 BPM | OXYGEN SATURATION: 97 % | BODY MASS INDEX: 23.06 KG/M2 | TEMPERATURE: 98 F | RESPIRATION RATE: 22 BRPM

## 2023-06-08 DIAGNOSIS — J44.9 CHRONIC OBSTRUCTIVE PULMONARY DISEASE, UNSPECIFIED COPD TYPE: ICD-10-CM

## 2023-06-08 DIAGNOSIS — R07.89 CHEST WALL PAIN: Primary | ICD-10-CM

## 2023-06-08 DIAGNOSIS — R07.9 CHEST PAIN: ICD-10-CM

## 2023-06-08 LAB
ALBUMIN SERPL BCP-MCNC: 3.9 G/DL (ref 3.5–5.2)
ALP SERPL-CCNC: 61 U/L (ref 55–135)
ALT SERPL W/O P-5'-P-CCNC: 17 U/L (ref 10–44)
ANION GAP SERPL CALC-SCNC: 9 MMOL/L (ref 8–16)
AST SERPL-CCNC: 18 U/L (ref 10–40)
BASOPHILS # BLD AUTO: 0.06 K/UL (ref 0–0.2)
BASOPHILS NFR BLD: 0.5 % (ref 0–1.9)
BILIRUB SERPL-MCNC: 0.2 MG/DL (ref 0.1–1)
BUN SERPL-MCNC: 18 MG/DL (ref 8–23)
CALCIUM SERPL-MCNC: 9.2 MG/DL (ref 8.7–10.5)
CHLORIDE SERPL-SCNC: 106 MMOL/L (ref 95–110)
CO2 SERPL-SCNC: 23 MMOL/L (ref 23–29)
CREAT SERPL-MCNC: 1.2 MG/DL (ref 0.5–1.4)
DIFFERENTIAL METHOD: ABNORMAL
EOSINOPHIL # BLD AUTO: 0 K/UL (ref 0–0.5)
EOSINOPHIL NFR BLD: 0.4 % (ref 0–8)
ERYTHROCYTE [DISTWIDTH] IN BLOOD BY AUTOMATED COUNT: 16.3 % (ref 11.5–14.5)
EST. GFR  (NO RACE VARIABLE): >60 ML/MIN/1.73 M^2
GLUCOSE SERPL-MCNC: 109 MG/DL (ref 70–110)
HCT VFR BLD AUTO: 43.9 % (ref 40–54)
HGB BLD-MCNC: 13.6 G/DL (ref 14–18)
IMM GRANULOCYTES # BLD AUTO: 0.2 K/UL (ref 0–0.04)
IMM GRANULOCYTES NFR BLD AUTO: 1.8 % (ref 0–0.5)
LYMPHOCYTES # BLD AUTO: 0.6 K/UL (ref 1–4.8)
LYMPHOCYTES NFR BLD: 5.3 % (ref 18–48)
MCH RBC QN AUTO: 26.9 PG (ref 27–31)
MCHC RBC AUTO-ENTMCNC: 31 G/DL (ref 32–36)
MCV RBC AUTO: 87 FL (ref 82–98)
MONOCYTES # BLD AUTO: 0.6 K/UL (ref 0.3–1)
MONOCYTES NFR BLD: 5.2 % (ref 4–15)
NEUTROPHILS # BLD AUTO: 9.5 K/UL (ref 1.8–7.7)
NEUTROPHILS NFR BLD: 86.8 % (ref 38–73)
NRBC BLD-RTO: 0 /100 WBC
PLATELET # BLD AUTO: 205 K/UL (ref 150–450)
PMV BLD AUTO: 10.8 FL (ref 9.2–12.9)
POTASSIUM SERPL-SCNC: 4.6 MMOL/L (ref 3.5–5.1)
PROT SERPL-MCNC: 6.9 G/DL (ref 6–8.4)
RBC # BLD AUTO: 5.06 M/UL (ref 4.6–6.2)
SODIUM SERPL-SCNC: 138 MMOL/L (ref 136–145)
TROPONIN I SERPL DL<=0.01 NG/ML-MCNC: 0.03 NG/ML (ref 0–0.03)
WBC # BLD AUTO: 10.99 K/UL (ref 3.9–12.7)

## 2023-06-08 PROCEDURE — 94761 N-INVAS EAR/PLS OXIMETRY MLT: CPT | Mod: ER

## 2023-06-08 PROCEDURE — 80053 COMPREHEN METABOLIC PANEL: CPT | Mod: ER | Performed by: EMERGENCY MEDICINE

## 2023-06-08 PROCEDURE — 84484 ASSAY OF TROPONIN QUANT: CPT | Mod: ER | Performed by: EMERGENCY MEDICINE

## 2023-06-08 PROCEDURE — 99285 EMERGENCY DEPT VISIT HI MDM: CPT | Mod: 25,ER

## 2023-06-08 PROCEDURE — 85025 COMPLETE CBC W/AUTO DIFF WBC: CPT | Mod: ER | Performed by: EMERGENCY MEDICINE

## 2023-06-08 NOTE — DISCHARGE INSTRUCTIONS
Tests are fine.      Pain from the chest muscles will gradually improve.      No change in usual treatment recommended.      Return as needed.
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
